# Patient Record
Sex: MALE | Race: WHITE | NOT HISPANIC OR LATINO | Employment: OTHER | URBAN - METROPOLITAN AREA
[De-identification: names, ages, dates, MRNs, and addresses within clinical notes are randomized per-mention and may not be internally consistent; named-entity substitution may affect disease eponyms.]

---

## 2019-08-12 ENCOUNTER — HOSPITAL ENCOUNTER (OUTPATIENT)
Dept: RADIOLOGY | Facility: MEDICAL CENTER | Age: 84
End: 2019-08-12
Attending: FAMILY MEDICINE
Payer: COMMERCIAL

## 2019-08-12 ENCOUNTER — OFFICE VISIT (OUTPATIENT)
Dept: URGENT CARE | Facility: PHYSICIAN GROUP | Age: 84
End: 2019-08-12
Payer: COMMERCIAL

## 2019-08-12 VITALS
HEART RATE: 94 BPM | SYSTOLIC BLOOD PRESSURE: 140 MMHG | RESPIRATION RATE: 14 BRPM | OXYGEN SATURATION: 95 % | TEMPERATURE: 98.8 F | WEIGHT: 195 LBS | DIASTOLIC BLOOD PRESSURE: 92 MMHG | BODY MASS INDEX: 26.41 KG/M2 | HEIGHT: 72 IN

## 2019-08-12 DIAGNOSIS — S69.92XA INJURY OF LEFT WRIST, INITIAL ENCOUNTER: ICD-10-CM

## 2019-08-12 PROCEDURE — 99213 OFFICE O/P EST LOW 20 MIN: CPT | Performed by: FAMILY MEDICINE

## 2019-08-12 PROCEDURE — 73110 X-RAY EXAM OF WRIST: CPT | Mod: LT

## 2019-08-15 ASSESSMENT — ENCOUNTER SYMPTOMS
NUMBNESS: 0
WEAKNESS: 0
JOINT SWELLING: 0

## 2019-08-15 NOTE — PROGRESS NOTES
Subjective:   Felipe Chowdary is a 87 y.o. male who presents for Hand Injury (L hand, fell this morning and used hand as a brace)     Hand Injury   This is a new problem. The current episode started today. The problem occurs constantly. The problem has been unchanged. Pertinent negatives include no joint swelling, numbness or weakness.     Review of Systems   Musculoskeletal: Negative for joint swelling.   Neurological: Negative for weakness and numbness.      Objective:   /92 (BP Location: Right arm, Patient Position: Sitting, BP Cuff Size: Adult)   Pulse 94   Temp 37.1 °C (98.8 °F) (Temporal)   Resp 14   Ht 1.829 m (6')   Wt 88.5 kg (195 lb)   SpO2 95%   BMI 26.45 kg/m²   Physical Exam   Constitutional: He is oriented to person, place, and time. He appears well-developed and well-nourished. No distress.   HENT:   Head: Normocephalic and atraumatic.   Eyes: Pupils are equal, round, and reactive to light. Conjunctivae are normal.   Cardiovascular: Normal rate and regular rhythm.   Pulmonary/Chest: Effort normal and breath sounds normal.   Musculoskeletal:        Left wrist: He exhibits decreased range of motion, tenderness, bony tenderness and swelling. He exhibits no deformity.   Neurological: He is alert and oriented to person, place, and time.   Skin: Skin is warm and dry.   Psychiatric: He has a normal mood and affect. Thought content normal.   Vitals reviewed.       Assessment/Plan:   1. Injury of left wrist, initial encounter  - DX-WRIST-COMPLETE 3+ LEFT; Future  - REFERRAL TO SPORTS MEDICINE  Use over-the-counter pain reliever, such as acetaminophen (Tylenol), ibuprofen (Advil, Motrin) or naproxen (Aleve) as needed; follow package directions for dosing.   Differential diagnosis, natural history, supportive care, and indications for immediate follow-up discussed.

## 2021-01-15 DIAGNOSIS — Z23 NEED FOR VACCINATION: ICD-10-CM

## 2022-05-25 ENCOUNTER — APPOINTMENT (OUTPATIENT)
Dept: RADIOLOGY | Facility: MEDICAL CENTER | Age: 87
End: 2022-05-25
Attending: INTERNAL MEDICINE
Payer: COMMERCIAL

## 2024-07-22 ENCOUNTER — HOSPITAL ENCOUNTER (OUTPATIENT)
Facility: MEDICAL CENTER | Age: 89
End: 2024-07-22
Attending: STUDENT IN AN ORGANIZED HEALTH CARE EDUCATION/TRAINING PROGRAM
Payer: MEDICARE

## 2024-07-22 ENCOUNTER — OFFICE VISIT (OUTPATIENT)
Dept: URGENT CARE | Facility: PHYSICIAN GROUP | Age: 89
End: 2024-07-22
Payer: MEDICARE

## 2024-07-22 VITALS
SYSTOLIC BLOOD PRESSURE: 126 MMHG | OXYGEN SATURATION: 93 % | HEART RATE: 62 BPM | DIASTOLIC BLOOD PRESSURE: 78 MMHG | TEMPERATURE: 96.7 F | HEIGHT: 73 IN | RESPIRATION RATE: 16 BRPM | WEIGHT: 194 LBS | BODY MASS INDEX: 25.71 KG/M2

## 2024-07-22 DIAGNOSIS — N30.01 ACUTE CYSTITIS WITH HEMATURIA: Primary | ICD-10-CM

## 2024-07-22 DIAGNOSIS — N30.01 ACUTE CYSTITIS WITH HEMATURIA: ICD-10-CM

## 2024-07-22 LAB
APPEARANCE UR: ABNORMAL
BILIRUB UR STRIP-MCNC: ABNORMAL MG/DL
COLOR UR AUTO: ABNORMAL
GLUCOSE UR STRIP.AUTO-MCNC: 100 MG/DL
KETONES UR STRIP.AUTO-MCNC: 80 MG/DL
LEUKOCYTE ESTERASE UR QL STRIP.AUTO: ABNORMAL
NITRITE UR QL STRIP.AUTO: POSITIVE
PH UR STRIP.AUTO: 8.5 [PH] (ref 5–8)
PROT UR QL STRIP: >=300 MG/DL
RBC UR QL AUTO: ABNORMAL
SP GR UR STRIP.AUTO: <=1.005
UROBILINOGEN UR STRIP-MCNC: >=8 MG/DL

## 2024-07-22 PROCEDURE — 99204 OFFICE O/P NEW MOD 45 MIN: CPT | Performed by: STUDENT IN AN ORGANIZED HEALTH CARE EDUCATION/TRAINING PROGRAM

## 2024-07-22 PROCEDURE — 3078F DIAST BP <80 MM HG: CPT | Performed by: STUDENT IN AN ORGANIZED HEALTH CARE EDUCATION/TRAINING PROGRAM

## 2024-07-22 PROCEDURE — 87086 URINE CULTURE/COLONY COUNT: CPT

## 2024-07-22 PROCEDURE — 87186 SC STD MICRODIL/AGAR DIL: CPT

## 2024-07-22 PROCEDURE — 3074F SYST BP LT 130 MM HG: CPT | Performed by: STUDENT IN AN ORGANIZED HEALTH CARE EDUCATION/TRAINING PROGRAM

## 2024-07-22 PROCEDURE — 87077 CULTURE AEROBIC IDENTIFY: CPT

## 2024-07-22 PROCEDURE — 81002 URINALYSIS NONAUTO W/O SCOPE: CPT | Performed by: STUDENT IN AN ORGANIZED HEALTH CARE EDUCATION/TRAINING PROGRAM

## 2024-07-22 RX ORDER — PHENAZOPYRIDINE HYDROCHLORIDE 200 MG/1
200 TABLET, FILM COATED ORAL 3 TIMES DAILY
Qty: 6 TABLET | Refills: 0 | Status: SHIPPED | OUTPATIENT
Start: 2024-07-22 | End: 2024-07-24

## 2024-07-22 RX ORDER — CIPROFLOXACIN 500 MG/1
500 TABLET, FILM COATED ORAL EVERY 12 HOURS
Qty: 14 TABLET | Refills: 0 | Status: SHIPPED | OUTPATIENT
Start: 2024-07-22 | End: 2024-07-29

## 2024-07-22 ASSESSMENT — ENCOUNTER SYMPTOMS
DIARRHEA: 0
ABDOMINAL PAIN: 0
VOMITING: 0
CONSTIPATION: 0
FLANK PAIN: 0
CHILLS: 0
FEVER: 0
NAUSEA: 0

## 2024-07-25 LAB
BACTERIA UR CULT: ABNORMAL
BACTERIA UR CULT: ABNORMAL
SIGNIFICANT IND 70042: ABNORMAL
SITE SITE: ABNORMAL
SOURCE SOURCE: ABNORMAL

## 2024-07-28 ENCOUNTER — TELEPHONE (OUTPATIENT)
Dept: URGENT CARE | Facility: PHYSICIAN GROUP | Age: 89
End: 2024-07-28
Payer: MEDICARE

## 2024-07-29 ENCOUNTER — TELEPHONE (OUTPATIENT)
Dept: URGENT CARE | Facility: CLINIC | Age: 89
End: 2024-07-29
Payer: MEDICARE

## 2024-07-29 ENCOUNTER — HOSPITAL ENCOUNTER (OUTPATIENT)
Facility: MEDICAL CENTER | Age: 89
End: 2024-07-29
Attending: STUDENT IN AN ORGANIZED HEALTH CARE EDUCATION/TRAINING PROGRAM
Payer: MEDICARE

## 2024-07-29 ENCOUNTER — TELEPHONE (OUTPATIENT)
Dept: URGENT CARE | Facility: PHYSICIAN GROUP | Age: 89
End: 2024-07-29
Payer: MEDICARE

## 2024-07-29 DIAGNOSIS — Z87.440 HISTORY OF UTI: ICD-10-CM

## 2024-07-29 PROCEDURE — 87086 URINE CULTURE/COLONY COUNT: CPT

## 2024-07-31 ENCOUNTER — TELEPHONE (OUTPATIENT)
Dept: URGENT CARE | Facility: PHYSICIAN GROUP | Age: 89
End: 2024-07-31
Payer: MEDICARE

## 2024-07-31 LAB
BACTERIA UR CULT: NORMAL
SIGNIFICANT IND 70042: NORMAL
SITE SITE: NORMAL
SOURCE SOURCE: NORMAL

## 2024-09-20 ENCOUNTER — HOSPITAL ENCOUNTER (OUTPATIENT)
Dept: RADIOLOGY | Facility: MEDICAL CENTER | Age: 89
End: 2024-09-20
Payer: COMMERCIAL

## 2024-10-08 ENCOUNTER — HOSPITAL ENCOUNTER (OUTPATIENT)
Dept: RADIOLOGY | Facility: MEDICAL CENTER | Age: 89
End: 2024-10-08
Attending: UROLOGY
Payer: COMMERCIAL

## 2024-10-08 DIAGNOSIS — R31.0 GROSS HEMATURIA: ICD-10-CM

## 2024-10-08 ASSESSMENT — ENCOUNTER SYMPTOMS
CARDIOVASCULAR NEGATIVE: 1
CHILLS: 0
WEAKNESS: 0
WEIGHT LOSS: 0
DIAPHORESIS: 0
FOCAL WEAKNESS: 0
FEVER: 0
CONSTITUTIONAL NEGATIVE: 1
SENSORY CHANGE: 0
RESPIRATORY NEGATIVE: 1
GASTROINTESTINAL NEGATIVE: 1
SPEECH CHANGE: 0

## 2024-10-08 ASSESSMENT — LIFESTYLE VARIABLES: SUBSTANCE_ABUSE: 0

## 2024-10-14 ENCOUNTER — APPOINTMENT (OUTPATIENT)
Dept: ADMISSIONS | Facility: MEDICAL CENTER | Age: 89
End: 2024-10-14
Attending: STUDENT IN AN ORGANIZED HEALTH CARE EDUCATION/TRAINING PROGRAM
Payer: COMMERCIAL

## 2024-10-17 ENCOUNTER — PRE-ADMISSION TESTING (OUTPATIENT)
Dept: ADMISSIONS | Facility: MEDICAL CENTER | Age: 89
End: 2024-10-17
Attending: STUDENT IN AN ORGANIZED HEALTH CARE EDUCATION/TRAINING PROGRAM
Payer: COMMERCIAL

## 2024-10-17 RX ORDER — TERAZOSIN 2 MG/1
4 CAPSULE ORAL NIGHTLY
COMMUNITY

## 2024-10-17 RX ORDER — PANTOPRAZOLE SODIUM 40 MG/1
40 TABLET, DELAYED RELEASE ORAL DAILY
COMMUNITY

## 2024-10-17 RX ORDER — ASPIRIN 81 MG/1
81 TABLET ORAL DAILY
Status: ON HOLD | COMMUNITY
End: 2024-11-24

## 2024-10-17 RX ORDER — LOSARTAN POTASSIUM 25 MG/1
25 TABLET ORAL DAILY
COMMUNITY

## 2024-10-17 RX ORDER — FINASTERIDE 5 MG/1
5 TABLET, FILM COATED ORAL DAILY
COMMUNITY

## 2024-10-31 ENCOUNTER — HOSPITAL ENCOUNTER (OUTPATIENT)
Facility: MEDICAL CENTER | Age: 89
End: 2024-10-31
Attending: STUDENT IN AN ORGANIZED HEALTH CARE EDUCATION/TRAINING PROGRAM | Admitting: STUDENT IN AN ORGANIZED HEALTH CARE EDUCATION/TRAINING PROGRAM
Payer: COMMERCIAL

## 2024-10-31 ENCOUNTER — APPOINTMENT (OUTPATIENT)
Dept: RADIOLOGY | Facility: MEDICAL CENTER | Age: 89
End: 2024-10-31
Attending: STUDENT IN AN ORGANIZED HEALTH CARE EDUCATION/TRAINING PROGRAM
Payer: COMMERCIAL

## 2024-10-31 VITALS
WEIGHT: 201.28 LBS | HEIGHT: 72 IN | OXYGEN SATURATION: 96 % | RESPIRATION RATE: 16 BRPM | DIASTOLIC BLOOD PRESSURE: 97 MMHG | TEMPERATURE: 96.8 F | HEART RATE: 77 BPM | SYSTOLIC BLOOD PRESSURE: 146 MMHG | BODY MASS INDEX: 27.26 KG/M2

## 2024-10-31 DIAGNOSIS — N13.8 BPH WITH URINARY OBSTRUCTION: ICD-10-CM

## 2024-10-31 DIAGNOSIS — R31.0 GROSS HEMATURIA: ICD-10-CM

## 2024-10-31 DIAGNOSIS — N40.1 BPH WITH URINARY OBSTRUCTION: ICD-10-CM

## 2024-10-31 LAB
BASOPHILS # BLD AUTO: 0.4 % (ref 0–1.8)
BASOPHILS # BLD: 0.02 K/UL (ref 0–0.12)
CREAT SERPL-MCNC: 1.26 MG/DL (ref 0.5–1.4)
EOSINOPHIL # BLD AUTO: 0.06 K/UL (ref 0–0.51)
EOSINOPHIL NFR BLD: 1.2 % (ref 0–6.9)
ERYTHROCYTE [DISTWIDTH] IN BLOOD BY AUTOMATED COUNT: 45.9 FL (ref 35.9–50)
GFR SERPLBLD CREATININE-BSD FMLA CKD-EPI: 53 ML/MIN/1.73 M 2
HCT VFR BLD AUTO: 41.7 % (ref 42–52)
HGB BLD-MCNC: 13.7 G/DL (ref 14–18)
IMM GRANULOCYTES # BLD AUTO: 0.01 K/UL (ref 0–0.11)
IMM GRANULOCYTES NFR BLD AUTO: 0.2 % (ref 0–0.9)
INR PPP: 1.04 (ref 0.87–1.13)
LYMPHOCYTES # BLD AUTO: 2.55 K/UL (ref 1–4.8)
LYMPHOCYTES NFR BLD: 49 % (ref 22–41)
MCH RBC QN AUTO: 30.9 PG (ref 27–33)
MCHC RBC AUTO-ENTMCNC: 32.9 G/DL (ref 32.3–36.5)
MCV RBC AUTO: 93.9 FL (ref 81.4–97.8)
MONOCYTES # BLD AUTO: 0.45 K/UL (ref 0–0.85)
MONOCYTES NFR BLD AUTO: 8.7 % (ref 0–13.4)
NEUTROPHILS # BLD AUTO: 2.11 K/UL (ref 1.82–7.42)
NEUTROPHILS NFR BLD: 40.5 % (ref 44–72)
NRBC # BLD AUTO: 0 K/UL
NRBC BLD-RTO: 0 /100 WBC (ref 0–0.2)
PLATELET # BLD AUTO: 137 K/UL (ref 164–446)
PMV BLD AUTO: 10.8 FL (ref 9–12.9)
PROTHROMBIN TIME: 13.6 SEC (ref 12–14.6)
RBC # BLD AUTO: 4.44 M/UL (ref 4.7–6.1)
WBC # BLD AUTO: 5.2 K/UL (ref 4.8–10.8)

## 2024-10-31 PROCEDURE — 85610 PROTHROMBIN TIME: CPT

## 2024-10-31 PROCEDURE — 160046 HCHG PACU - 1ST 60 MINS PHASE II

## 2024-10-31 PROCEDURE — 700102 HCHG RX REV CODE 250 W/ 637 OVERRIDE(OP): Performed by: STUDENT IN AN ORGANIZED HEALTH CARE EDUCATION/TRAINING PROGRAM

## 2024-10-31 PROCEDURE — 160002 HCHG RECOVERY MINUTES (STAT)

## 2024-10-31 PROCEDURE — 85025 COMPLETE CBC W/AUTO DIFF WBC: CPT

## 2024-10-31 PROCEDURE — 700111 HCHG RX REV CODE 636 W/ 250 OVERRIDE (IP): Performed by: STUDENT IN AN ORGANIZED HEALTH CARE EDUCATION/TRAINING PROGRAM

## 2024-10-31 PROCEDURE — 82565 ASSAY OF CREATININE: CPT

## 2024-10-31 PROCEDURE — A9270 NON-COVERED ITEM OR SERVICE: HCPCS | Performed by: STUDENT IN AN ORGANIZED HEALTH CARE EDUCATION/TRAINING PROGRAM

## 2024-10-31 PROCEDURE — 160047 HCHG PACU  - EA ADDL 30 MINS PHASE II

## 2024-10-31 PROCEDURE — 84153 ASSAY OF PSA TOTAL: CPT

## 2024-10-31 PROCEDURE — 700117 HCHG RX CONTRAST REV CODE 255: Performed by: STUDENT IN AN ORGANIZED HEALTH CARE EDUCATION/TRAINING PROGRAM

## 2024-10-31 PROCEDURE — 700111 HCHG RX REV CODE 636 W/ 250 OVERRIDE (IP)

## 2024-10-31 PROCEDURE — 99153 MOD SED SAME PHYS/QHP EA: CPT

## 2024-10-31 PROCEDURE — 36245 INS CATH ABD/L-EXT ART 1ST: CPT

## 2024-10-31 PROCEDURE — 160035 HCHG PACU - 1ST 60 MINS PHASE I

## 2024-10-31 RX ORDER — MIDAZOLAM HYDROCHLORIDE 1 MG/ML
INJECTION INTRAMUSCULAR; INTRAVENOUS
Status: COMPLETED
Start: 2024-10-31 | End: 2024-10-31

## 2024-10-31 RX ORDER — SODIUM CHLORIDE 9 MG/ML
500 INJECTION, SOLUTION INTRAVENOUS
Status: ACTIVE | OUTPATIENT
Start: 2024-10-31 | End: 2024-10-31

## 2024-10-31 RX ORDER — CIPROFLOXACIN 750 MG/1
750 TABLET, FILM COATED ORAL 2 TIMES DAILY
Qty: 10 TABLET | Refills: 0 | Status: SHIPPED | OUTPATIENT
Start: 2024-10-31 | End: 2024-11-04 | Stop reason: SDUPTHER

## 2024-10-31 RX ORDER — ONDANSETRON 2 MG/ML
4 INJECTION INTRAMUSCULAR; INTRAVENOUS PRN
Status: ACTIVE | OUTPATIENT
Start: 2024-10-31 | End: 2024-10-31

## 2024-10-31 RX ORDER — CIPROFLOXACIN 750 MG/1
750 TABLET, FILM COATED ORAL EVERY 12 HOURS
Status: DISCONTINUED | OUTPATIENT
Start: 2024-10-31 | End: 2024-10-31

## 2024-10-31 RX ORDER — MIDAZOLAM HYDROCHLORIDE 1 MG/ML
INJECTION INTRAMUSCULAR; INTRAVENOUS
Status: COMPLETED | OUTPATIENT
Start: 2024-10-31 | End: 2024-10-31

## 2024-10-31 RX ORDER — CIPROFLOXACIN 750 MG/1
750 TABLET, FILM COATED ORAL EVERY 12 HOURS
Status: SHIPPED | OUTPATIENT
Start: 2024-10-31 | End: 2024-11-10

## 2024-10-31 RX ORDER — CIPROFLOXACIN 750 MG/1
750 TABLET, FILM COATED ORAL EVERY 12 HOURS
Status: DISCONTINUED | OUTPATIENT
Start: 2024-10-31 | End: 2024-10-31 | Stop reason: HOSPADM

## 2024-10-31 RX ORDER — MIDAZOLAM HYDROCHLORIDE 1 MG/ML
.5-2 INJECTION INTRAMUSCULAR; INTRAVENOUS PRN
Status: ACTIVE | OUTPATIENT
Start: 2024-10-31 | End: 2024-10-31

## 2024-10-31 RX ORDER — HYDRALAZINE HYDROCHLORIDE 20 MG/ML
10 INJECTION INTRAMUSCULAR; INTRAVENOUS ONCE
Status: COMPLETED | OUTPATIENT
Start: 2024-10-31 | End: 2024-10-31

## 2024-10-31 RX ADMIN — MIDAZOLAM HYDROCHLORIDE 1 MG: 1 INJECTION, SOLUTION INTRAMUSCULAR; INTRAVENOUS at 09:12

## 2024-10-31 RX ADMIN — MIDAZOLAM HYDROCHLORIDE 1 MG: 1 INJECTION, SOLUTION INTRAMUSCULAR; INTRAVENOUS at 08:44

## 2024-10-31 RX ADMIN — FENTANYL CITRATE 50 MCG: 50 INJECTION, SOLUTION INTRAMUSCULAR; INTRAVENOUS at 08:28

## 2024-10-31 RX ADMIN — IOHEXOL 112 ML: 300 INJECTION, SOLUTION INTRAVENOUS at 10:15

## 2024-10-31 RX ADMIN — MIDAZOLAM HYDROCHLORIDE 1 MG: 2 INJECTION, SOLUTION INTRAMUSCULAR; INTRAVENOUS at 09:12

## 2024-10-31 RX ADMIN — FENTANYL CITRATE 25 MCG: 50 INJECTION, SOLUTION INTRAMUSCULAR; INTRAVENOUS at 09:01

## 2024-10-31 RX ADMIN — FENTANYL CITRATE 50 MCG: 50 INJECTION, SOLUTION INTRAMUSCULAR; INTRAVENOUS at 08:52

## 2024-10-31 RX ADMIN — FENTANYL CITRATE 50 MCG: 50 INJECTION, SOLUTION INTRAMUSCULAR; INTRAVENOUS at 08:38

## 2024-10-31 RX ADMIN — FENTANYL CITRATE 50 MCG: 50 INJECTION, SOLUTION INTRAMUSCULAR; INTRAVENOUS at 09:12

## 2024-10-31 RX ADMIN — MIDAZOLAM HYDROCHLORIDE 1 MG: 2 INJECTION, SOLUTION INTRAMUSCULAR; INTRAVENOUS at 08:44

## 2024-10-31 RX ADMIN — CIPROFLOXACIN HYDROCHLORIDE 750 MG: 750 TABLET, FILM COATED ORAL at 14:58

## 2024-10-31 RX ADMIN — HYDRALAZINE HYDROCHLORIDE 10 MG: 20 INJECTION INTRAMUSCULAR; INTRAVENOUS at 07:39

## 2024-10-31 RX ADMIN — FENTANYL CITRATE 25 MCG: 50 INJECTION, SOLUTION INTRAMUSCULAR; INTRAVENOUS at 09:03

## 2024-10-31 RX ADMIN — MIDAZOLAM HYDROCHLORIDE 1 MG: 2 INJECTION, SOLUTION INTRAMUSCULAR; INTRAVENOUS at 08:28

## 2024-10-31 RX ADMIN — MIDAZOLAM HYDROCHLORIDE 1 MG: 1 INJECTION, SOLUTION INTRAMUSCULAR; INTRAVENOUS at 08:28

## 2024-10-31 RX ADMIN — MIDAZOLAM HYDROCHLORIDE 1 MG: 2 INJECTION, SOLUTION INTRAMUSCULAR; INTRAVENOUS at 08:17

## 2024-10-31 RX ADMIN — MIDAZOLAM HYDROCHLORIDE 1 MG: 2 INJECTION, SOLUTION INTRAMUSCULAR; INTRAVENOUS at 09:27

## 2024-10-31 RX ADMIN — MIDAZOLAM HYDROCHLORIDE 1 MG: 2 INJECTION, SOLUTION INTRAMUSCULAR; INTRAVENOUS at 08:55

## 2024-10-31 NOTE — OR SURGEON
Immediate Post- Operative Note        Findings: Right prostate artery      Procedure(s): Right prostate artery embo      Estimated Blood Loss: Less than 5 ml        Complications: None            10/31/2024     9:46 AM     Francisco Javier Tiwari M.D.

## 2024-10-31 NOTE — DISCHARGE INSTRUCTIONS
Embolization, Care After  The following information offers guidance on how to care for yourself after your procedure. Your health care provider may also give you more specific instructions. If you have problems or questions, contact your health care provider.  What can I expect after the procedure?  After the procedure, it is common to have pain or bruising at the area where your catheter was inserted (insertion site).  You may have other symptoms depending on the part of your body that was treated. For example:  Embolization of the arteries in the brain may cause a headache.  Embolization of the arteries in the stomach may cause loss of appetite or nausea.  Women who have embolization of fibroid tumors in the uterus may have pain or cramps.  Follow these instructions at home:  Insertion site care  If the site starts to bleed, lie down flat and put pressure on the site. If the bleeding does not stop, get help right away. This is a medical emergency.  Follow instructions from your health care provider about how to take care of your insertion site. Make sure you:  Wash your hands with soap and water for at least 20 seconds before and after you change your bandage (dressing). If soap and water are not available, use hand .  You may remove the dressing after 24 hours. You can shower after dressing has been removed, but no submerging in water (ex: baths, lakes, hot tubs) for one week  Keep the insertion site clean. To do this:  Gently wash the site with plain soap and water.  Pat the area dry with a clean towel.  Do not rub the site. This may cause bleeding.  Check your insertion site every day for signs of infection. Check for:  Redness, swelling, or pain.  Fluid or blood.  Warmth.  Pus or a bad smell.  Do not take baths, swim, or use a hot tub until your health care provider approves. You may be allowed to shower 24-48 hours after the procedure.  Activity  Do not lift anything heavier than 10 pound for one  week. If possible limit the amount of time you go up/down stairs. Avoid bending and squatting down   If you were given a sedative during the procedure, it can affect you for several hours. Do not drive or operate machinery until your health care provider says that it is safe.  Return to your normal activities as told by your health care provider. Ask your health care provider what activities are safe for you.  General instructions  Take over-the-counter and prescription medicines only as told by your health care provider.  Ask your health care provider if the medicine prescribed to you:  Requires you to avoid driving or using machinery.  Can cause constipation. You may need to take these actions to prevent or treat constipation:  Drink enough fluid to keep your urine pale yellow.  Take over-the-counter or prescription medicines.  Eat foods that are high in fiber, such as beans, whole grains, and fresh fruits and vegetables.  Limit foods that are high in fat and processed sugars, such as fried or sweet foods.  Keep all follow-up visits. This is important.  Contact a health care provider if:  You have pain that gets worse or does not get better with medicine.  You have redness, swelling, or pain around your insertion site.  You have fluid or blood coming from your insertion site.  Your insertion site feels warm to the touch.  You have pus or a bad smell coming from your insertion site.  You have a fever.  You have nausea or vomiting.  Get help right away if:  The insertion area swells very quickly.  The insertion area is bleeding, and the bleeding does not stop after you hold steady pressure on the area.  The area near or just beyond the insertion site becomes pale, cool, tingly, or numb.  You faint or feel like you might faint.  You have chest pain.  You have trouble breathing.  You feel weak or have trouble moving your arms or legs.  You have problems with balance, speech, or vision.  These symptoms may be an  emergency. Get help right away. Call 911.  Do not wait to see if the symptoms will go away.  Do not drive yourself to the hospital.  Summary  After your procedure, it is common to have pain or bruising at the area where your catheter was inserted.  If you were given a sedative during the procedure, it can affect you for several hours. Do not drive or operate machinery until your health care provider says that it is safe.  Follow instructions from your health care provider about how to take care of your insertion site and about activity restrictions.  Contact a health care provider if you have a fever or if you have redness, swelling, or pain around your insertion site.  If the site starts to bleed, lie down flat and put pressure on the site. If the bleeding does not stop, get help right away. This is a medical emergency.  This information is not intended to replace advice given to you by your health care provider. Make sure you discuss any questions you have with your health care provider.  Document Revised: 11/07/2022 Document Reviewed: 10/09/2022  Elsevier Patient Education © 2023 [employer]

## 2024-10-31 NOTE — PROGRESS NOTES
Pt presents to IR1. Patient was consented by MD at bedside, confirmed by this RN and consent at bedside. Pt transferred to IR table in Supine position. Patient underwent a Prostate artery embolization by Dr. Tiwari.  Patient hypertensive in PPU, hypertensive when arrived to IR. MD verbal order 1x dose of Versed 1mg IVP for anxiety. Procedure site was marked by MD and verified using imaging guidance. Pt placed on monitor, prepped and draped in a sterile fashion. Vitals were taken every 5 minutes and remained stable during procedure (see doc flow sheet for results). CO2 waveform capnography was monitored and remained WNL throughout procedure. Report called to Rahel PPU RN. Pt transported by stretcher with RN to PPU 3.         [facility]  DEPLOYED IN Right prostate artery   REF: [id number]  LOT: [id number]  EXP: 2027-04-03

## 2024-10-31 NOTE — PROGRESS NOTES
1000- Patient arrived to PACU. Report received. Attached to monitors. Verified parameters and alarms. Right groin site is CDI and soft. Right pedal pulse 1+. Patient denies pain, numbness, and tingling. Patient educated on bedrest orders until 1400    1013- Patient tolerated PO fluids. Belongings returned to patient. Update provided to Norma hill. All questions answered     1030- Patient tolerated PO snack    1140- medrano removed per protocol     1410- Bed rest completed per orders. Patient ambulated with steady gait. No bleeding or firmness observed at groin site post ambulation. Right groin site is CDI and soft. Encouraged patient to increase oral intake of fluids     1440- patient ambulated to restroom with steady gait. Patient voided     1500- Site is clean, dry, and intact. Discharge instructions provided to patient and relative. Discussed follow up appointments, diet, medications and activity. Patient states understanding. IV was removed. Copy of discharge provided to the patient. A signed copy of discharge is in patient's chart. All personal belongings are in patients possession. All questions have been answered.     1510- Patient wheeled off floor by RN

## 2024-11-02 ENCOUNTER — HOSPITAL ENCOUNTER (OUTPATIENT)
Facility: MEDICAL CENTER | Age: 89
End: 2024-11-02
Attending: FAMILY MEDICINE
Payer: COMMERCIAL

## 2024-11-02 ENCOUNTER — OFFICE VISIT (OUTPATIENT)
Dept: URGENT CARE | Facility: PHYSICIAN GROUP | Age: 89
End: 2024-11-02
Payer: COMMERCIAL

## 2024-11-02 VITALS
WEIGHT: 200 LBS | HEART RATE: 85 BPM | TEMPERATURE: 97.1 F | SYSTOLIC BLOOD PRESSURE: 128 MMHG | BODY MASS INDEX: 27.09 KG/M2 | DIASTOLIC BLOOD PRESSURE: 72 MMHG | RESPIRATION RATE: 16 BRPM | OXYGEN SATURATION: 95 % | HEIGHT: 72 IN

## 2024-11-02 DIAGNOSIS — R30.0 DYSURIA: ICD-10-CM

## 2024-11-02 LAB
APPEARANCE UR: NORMAL
BILIRUB UR STRIP-MCNC: NORMAL MG/DL
COLOR UR AUTO: NORMAL
GLUCOSE UR STRIP.AUTO-MCNC: NORMAL MG/DL
KETONES UR STRIP.AUTO-MCNC: NORMAL MG/DL
LEUKOCYTE ESTERASE UR QL STRIP.AUTO: NORMAL
NITRITE UR QL STRIP.AUTO: POSITIVE
PH UR STRIP.AUTO: 5 [PH] (ref 5–8)
PROT UR QL STRIP: NORMAL MG/DL
PSA FREE MFR SERPL: NORMAL %
PSA FREE SERPL-MCNC: NORMAL NG/ML
PSA SERPL-MCNC: 1.5 NG/ML (ref 0–4)
RBC UR QL AUTO: NORMAL
SP GR UR STRIP.AUTO: 1.02
UROBILINOGEN UR STRIP-MCNC: NORMAL MG/DL

## 2024-11-02 PROCEDURE — 3078F DIAST BP <80 MM HG: CPT | Performed by: FAMILY MEDICINE

## 2024-11-02 PROCEDURE — 3074F SYST BP LT 130 MM HG: CPT | Performed by: FAMILY MEDICINE

## 2024-11-02 PROCEDURE — 99213 OFFICE O/P EST LOW 20 MIN: CPT | Performed by: FAMILY MEDICINE

## 2024-11-02 PROCEDURE — 87086 URINE CULTURE/COLONY COUNT: CPT

## 2024-11-02 PROCEDURE — 81002 URINALYSIS NONAUTO W/O SCOPE: CPT | Performed by: FAMILY MEDICINE

## 2024-11-02 NOTE — PROGRESS NOTES
"Follow-up note:    Called patient to follow-up after prostate artery embolization. Patient expresses that he is having burning with urination and can only urinate \"a few drops.\" Patient was able to have a large, pain-free urination prior to discharge and after removal of the Ayala catheter. Has been taking his ciprofloxacin since discharge.    Due to concerns for urinary tract infection, recommend patient be evaluated at urgent care for further workup and evaluation to determine if he has an active urinary tract infection or irritation from the Ayala catheter. Patient is agreeable with this plan.    Recommend cultures if patient has evidence of UTI to better tailor antibiotics.  "

## 2024-11-02 NOTE — PROGRESS NOTES
Subjective     Felipe Chowdary is a 89 y.o. male who presents with Dysuria (Burning while urinating, was seen for UTI and have medication for it but not working X  2 days )            2 days urinary burning after prostate embolization.  Currently taking both Azo and Cipro.  No fever.  No flank pain.  No blood in urine.  He does have PMH UTI.  No other aggravating or alleviating factors.        Review of Systems   Constitutional:  Negative for malaise/fatigue and weight loss.   Eyes:  Negative for discharge and redness.   Gastrointestinal:  Negative for nausea and vomiting.   Musculoskeletal:  Negative for joint pain and myalgias.   Skin:  Negative for itching and rash.              Objective     /72 (BP Location: Right arm, Patient Position: Sitting, BP Cuff Size: Adult)   Pulse 85   Temp 36.2 °C (97.1 °F) (Temporal)   Resp 16   Ht 1.829 m (6')   Wt 90.7 kg (200 lb)   SpO2 95%   BMI 27.12 kg/m²      Physical Exam  Constitutional:       General: He is not in acute distress.     Appearance: He is well-developed.   HENT:      Head: Normocephalic and atraumatic.   Eyes:      Conjunctiva/sclera: Conjunctivae normal.   Cardiovascular:      Rate and Rhythm: Normal rate.      Heart sounds: Normal heart sounds. No murmur heard.  Pulmonary:      Effort: Pulmonary effort is normal.      Breath sounds: Normal breath sounds. No wheezing.   Abdominal:      Palpations: Abdomen is soft.      Tenderness: There is no abdominal tenderness. There is no right CVA tenderness or left CVA tenderness.   Skin:     General: Skin is warm and dry.      Findings: No rash.   Neurological:      Mental Status: He is alert.                             Assessment & Plan   UA reviewed: Unreadable due to Azo     1. Dysuria  POCT Urinalysis    URINE CULTURE(NEW)        Differential diagnosis, natural history, supportive care, and indications for immediate follow-up were discussed.     Recommend continue Cipro.  Will follow-up urine  culture.

## 2024-11-03 ASSESSMENT — ENCOUNTER SYMPTOMS
WEIGHT LOSS: 0
EYE DISCHARGE: 0
VOMITING: 0
NAUSEA: 0
MYALGIAS: 0
EYE REDNESS: 0

## 2024-11-04 DIAGNOSIS — N40.1 BPH WITH URINARY OBSTRUCTION: ICD-10-CM

## 2024-11-04 DIAGNOSIS — N13.8 BPH WITH URINARY OBSTRUCTION: ICD-10-CM

## 2024-11-04 LAB
BACTERIA UR CULT: NORMAL
SIGNIFICANT IND 70042: NORMAL
SITE SITE: NORMAL
SOURCE SOURCE: NORMAL

## 2024-11-04 RX ORDER — CIPROFLOXACIN 750 MG/1
750 TABLET, FILM COATED ORAL 2 TIMES DAILY
Qty: 10 TABLET | Refills: 0 | Status: ON HOLD | OUTPATIENT
Start: 2024-11-04 | End: 2024-11-24

## 2024-11-04 NOTE — PROGRESS NOTES
"Pt called IR NP regarding post embolization problems with urinating \"fire\" since the procedure w/Dr. Tiwari 10/31/24. Was seen at  on 11/2, had UA and C & S, negative at 48 hours. Of note, had a urethral catheter for the embolization procedure but was able to urinate normally after it was discontinued in the PACU. Has been taking Azo and Cipro with no relief. Denies fevers. Notes he was supposed to have 10 days of Cipro but only has 5 days. He has been unable to get [facility], and was told he needed a new referral although he is an established patient.    Describes pain is present only with urination and on the inside of his urethra and 10/10. Describes frequency & nocturia, has been up every 30-45 minutes with minimal urination and does not think he is emptying. It is not the same as his frequent UTI symptoms. Also describes pain with defecation, tried an enema, last regular BM prior to the procedure. Has not noticed any color changes in his penis or perineal area.     Will send in rx to provide 10 days of antibiotic coverage. Advised pt to call his urologist again and if unable to secure an appointment in the short term, he should present to his ER for evaluation as he is not improved after measures prescribed by  visit. Discussed that there can be post embolization inflammation and he should be evaluated for urinary retention. Update send to DONNA CHRISTOPHER on call, will forward note [facility].   "

## 2024-11-05 NOTE — PROGRESS NOTES
Call from [facility] coordinator who spoke with pt regarding complaints from yesterday. He had contacted IR NP again today but no opportunity to return call before pt contacted [facility], and he does not recall 18 minute discussion w/this APRN from 11/4. He has similar complaints as yesterday and did not seek workup at ED.     Discussed PAE with the [facility] clinician, who has also recommended that the pt present to the [facility] for [facility]. Discussed additional post procedure precautions and e-faxed pt's IR records to her fax number. Appreciate collaborative care for this patient. We are available for any additional questions.

## 2024-11-12 NOTE — PROGRESS NOTES
Telephone encounter:    Received call from patient regarding continued pain, frequency, dysuria that began a couple days post prostate artery embolization with IR Dr. Tiwari on 10/31/2024.  Following procedure, patient was discharged on Cipro.  Patient went to the urgent care 11/02/24; UA negative.  He called MARLYN Dunaway, 11/04/2024 who advised him to go to the [facility].  Ml Banegas spoke with [facility] coordinator who also recommended [facility] ED for evaluation.  [facility] reports [facility] and received what he described as a comprehensive workup including bladder ultrasound that was ultimately negative.  Patient denies fever, body aches, chills.  In addition to frequency, burning, and occasional sharp pain, he reports constipation and some lower extremity edema.  No SOB, chest pain, orthopnea.    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  Reassured patient that this is a self-limiting condition that should gradually resolve.  While I do not believe the constipation is related to the procedure, I do believe it could be exacerbating the prostate pain and recommend he continue stool softeners that he [facility] the [facility].  Encourage patient to ambulate and elevate feet while at rest to help with edema as he reports he has been quite sedentary since procedure on 10/31.  He does report this is starting to improve.    ER precautions reviewed including fever, chills, body aches, increased shortness of breath, chest pain, and inability to void.  If symptoms are not improving within the next 2 weeks, encouraged patient to call back.  I also encouraged patient to follow-up with his urologist.  Patient voiced appreciation for information. 22 minutes total spent on the phone with pt.

## 2024-11-19 ENCOUNTER — HOSPITAL ENCOUNTER (INPATIENT)
Facility: MEDICAL CENTER | Age: 89
LOS: 4 days | End: 2024-11-24
Attending: STUDENT IN AN ORGANIZED HEALTH CARE EDUCATION/TRAINING PROGRAM | Admitting: STUDENT IN AN ORGANIZED HEALTH CARE EDUCATION/TRAINING PROGRAM
Payer: COMMERCIAL

## 2024-11-19 ENCOUNTER — APPOINTMENT (OUTPATIENT)
Dept: RADIOLOGY | Facility: MEDICAL CENTER | Age: 89
End: 2024-11-19
Attending: STUDENT IN AN ORGANIZED HEALTH CARE EDUCATION/TRAINING PROGRAM
Payer: COMMERCIAL

## 2024-11-19 DIAGNOSIS — E87.1 HYPONATREMIA: ICD-10-CM

## 2024-11-19 DIAGNOSIS — D62 ACUTE BLOOD LOSS ANEMIA: ICD-10-CM

## 2024-11-19 DIAGNOSIS — R31.0 GROSS HEMATURIA: ICD-10-CM

## 2024-11-19 DIAGNOSIS — G89.18 POST-OPERATIVE PAIN: ICD-10-CM

## 2024-11-19 DIAGNOSIS — K59.03 DRUG-INDUCED CONSTIPATION: ICD-10-CM

## 2024-11-19 DIAGNOSIS — B37.41 CANDIDA CYSTITIS: ICD-10-CM

## 2024-11-19 PROBLEM — K92.2 UPPER GI BLEED: Status: ACTIVE | Noted: 2024-11-19

## 2024-11-19 PROBLEM — R31.9 HEMATURIA: Status: ACTIVE | Noted: 2024-11-19

## 2024-11-19 PROBLEM — I16.0 HYPERTENSIVE URGENCY: Status: ACTIVE | Noted: 2024-11-19

## 2024-11-19 PROBLEM — J96.01 ACUTE HYPOXIC RESPIRATORY FAILURE (HCC): Status: ACTIVE | Noted: 2024-11-19

## 2024-11-19 PROBLEM — N40.0 BPH (BENIGN PROSTATIC HYPERPLASIA): Status: ACTIVE | Noted: 2024-11-19

## 2024-11-19 LAB
ALBUMIN SERPL BCP-MCNC: 3.3 G/DL (ref 3.2–4.9)
ALBUMIN/GLOB SERPL: 1.1 G/DL
ALP SERPL-CCNC: 110 U/L (ref 30–99)
ALT SERPL-CCNC: 33 U/L (ref 2–50)
ANION GAP SERPL CALC-SCNC: 11 MMOL/L (ref 7–16)
APPEARANCE UR: ABNORMAL
AST SERPL-CCNC: 23 U/L (ref 12–45)
BACTERIA #/AREA URNS HPF: ABNORMAL /HPF
BASOPHILS # BLD AUTO: 0.1 % (ref 0–1.8)
BASOPHILS # BLD: 0.01 K/UL (ref 0–0.12)
BILIRUB SERPL-MCNC: 0.7 MG/DL (ref 0.1–1.5)
BUN SERPL-MCNC: 23 MG/DL (ref 8–22)
CALCIUM ALBUM COR SERPL-MCNC: 9.1 MG/DL (ref 8.5–10.5)
CALCIUM SERPL-MCNC: 8.5 MG/DL (ref 8.5–10.5)
CASTS URNS QL MICRO: ABNORMAL /LPF (ref 0–2)
CHLORIDE SERPL-SCNC: 94 MMOL/L (ref 96–112)
CO2 SERPL-SCNC: 22 MMOL/L (ref 20–33)
COLOR UR: ABNORMAL
CREAT SERPL-MCNC: 1.35 MG/DL (ref 0.5–1.4)
EOSINOPHIL # BLD AUTO: 0.02 K/UL (ref 0–0.51)
EOSINOPHIL NFR BLD: 0.2 % (ref 0–6.9)
EPITHELIAL CELLS 1715: ABNORMAL /HPF (ref 0–5)
ERYTHROCYTE [DISTWIDTH] IN BLOOD BY AUTOMATED COUNT: 44 FL (ref 35.9–50)
GFR SERPLBLD CREATININE-BSD FMLA CKD-EPI: 49 ML/MIN/1.73 M 2
GLOBULIN SER CALC-MCNC: 3 G/DL (ref 1.9–3.5)
GLUCOSE SERPL-MCNC: 104 MG/DL (ref 65–99)
HCT VFR BLD AUTO: 30.1 % (ref 42–52)
HGB BLD-MCNC: 10.3 G/DL (ref 14–18)
IMM GRANULOCYTES # BLD AUTO: 0.1 K/UL (ref 0–0.11)
IMM GRANULOCYTES NFR BLD AUTO: 1.2 % (ref 0–0.9)
LYMPHOCYTES # BLD AUTO: 1.55 K/UL (ref 1–4.8)
LYMPHOCYTES NFR BLD: 19.1 % (ref 22–41)
MCH RBC QN AUTO: 30.7 PG (ref 27–33)
MCHC RBC AUTO-ENTMCNC: 34.2 G/DL (ref 32.3–36.5)
MCV RBC AUTO: 89.9 FL (ref 81.4–97.8)
MONOCYTES # BLD AUTO: 0.59 K/UL (ref 0–0.85)
MONOCYTES NFR BLD AUTO: 7.3 % (ref 0–13.4)
NEUTROPHILS # BLD AUTO: 5.86 K/UL (ref 1.82–7.42)
NEUTROPHILS NFR BLD: 72.1 % (ref 44–72)
NRBC # BLD AUTO: 0 K/UL
NRBC BLD-RTO: 0 /100 WBC (ref 0–0.2)
PLATELET # BLD AUTO: 361 K/UL (ref 164–446)
PMV BLD AUTO: 8.7 FL (ref 9–12.9)
POTASSIUM SERPL-SCNC: 4.9 MMOL/L (ref 3.6–5.5)
PROT SERPL-MCNC: 6.3 G/DL (ref 6–8.2)
RBC # BLD AUTO: 3.35 M/UL (ref 4.7–6.1)
RBC # URNS HPF: >100 /HPF (ref 0–2)
SODIUM SERPL-SCNC: 127 MMOL/L (ref 135–145)
WBC # BLD AUTO: 8.1 K/UL (ref 4.8–10.8)
WBC #/AREA URNS HPF: ABNORMAL /HPF
YEAST BUDDING URNS QL: PRESENT /HPF

## 2024-11-19 PROCEDURE — 99285 EMERGENCY DEPT VISIT HI MDM: CPT

## 2024-11-19 PROCEDURE — 51702 INSERT TEMP BLADDER CATH: CPT

## 2024-11-19 PROCEDURE — 85025 COMPLETE CBC W/AUTO DIFF WBC: CPT

## 2024-11-19 PROCEDURE — 80053 COMPREHEN METABOLIC PANEL: CPT

## 2024-11-19 PROCEDURE — 700111 HCHG RX REV CODE 636 W/ 250 OVERRIDE (IP): Mod: JZ | Performed by: STUDENT IN AN ORGANIZED HEALTH CARE EDUCATION/TRAINING PROGRAM

## 2024-11-19 PROCEDURE — 36415 COLL VENOUS BLD VENIPUNCTURE: CPT

## 2024-11-19 PROCEDURE — A9270 NON-COVERED ITEM OR SERVICE: HCPCS | Performed by: STUDENT IN AN ORGANIZED HEALTH CARE EDUCATION/TRAINING PROGRAM

## 2024-11-19 PROCEDURE — 96374 THER/PROPH/DIAG INJ IV PUSH: CPT

## 2024-11-19 PROCEDURE — 81001 URINALYSIS AUTO W/SCOPE: CPT

## 2024-11-19 PROCEDURE — 85730 THROMBOPLASTIN TIME PARTIAL: CPT

## 2024-11-19 PROCEDURE — 303105 HCHG CATHETER EXTRA

## 2024-11-19 PROCEDURE — 99223 1ST HOSP IP/OBS HIGH 75: CPT | Performed by: STUDENT IN AN ORGANIZED HEALTH CARE EDUCATION/TRAINING PROGRAM

## 2024-11-19 PROCEDURE — 84443 ASSAY THYROID STIM HORMONE: CPT

## 2024-11-19 PROCEDURE — 85610 PROTHROMBIN TIME: CPT

## 2024-11-19 PROCEDURE — 700101 HCHG RX REV CODE 250: Performed by: STUDENT IN AN ORGANIZED HEALTH CARE EDUCATION/TRAINING PROGRAM

## 2024-11-19 PROCEDURE — G0378 HOSPITAL OBSERVATION PER HR: HCPCS

## 2024-11-19 PROCEDURE — 700102 HCHG RX REV CODE 250 W/ 637 OVERRIDE(OP): Performed by: STUDENT IN AN ORGANIZED HEALTH CARE EDUCATION/TRAINING PROGRAM

## 2024-11-19 RX ORDER — FLUCONAZOLE 100 MG/1
200 TABLET ORAL ONCE
Status: COMPLETED | OUTPATIENT
Start: 2024-11-19 | End: 2024-11-19

## 2024-11-19 RX ORDER — HYDRALAZINE HYDROCHLORIDE 20 MG/ML
10 INJECTION INTRAMUSCULAR; INTRAVENOUS EVERY 4 HOURS PRN
Status: DISCONTINUED | OUTPATIENT
Start: 2024-11-19 | End: 2024-11-24 | Stop reason: HOSPADM

## 2024-11-19 RX ORDER — PHENAZOPYRIDINE HYDROCHLORIDE 200 MG/1
200 TABLET, FILM COATED ORAL 3 TIMES DAILY PRN
COMMUNITY

## 2024-11-19 RX ORDER — AMLODIPINE BESYLATE 5 MG/1
5 TABLET ORAL EVERY EVENING
COMMUNITY

## 2024-11-19 RX ORDER — LIDOCAINE HYDROCHLORIDE 20 MG/ML
JELLY TOPICAL ONCE
Status: COMPLETED | OUTPATIENT
Start: 2024-11-19 | End: 2024-11-19

## 2024-11-19 RX ORDER — DOCUSATE CALCIUM 240 MG
240 CAPSULE ORAL DAILY
COMMUNITY

## 2024-11-19 RX ORDER — PANTOPRAZOLE SODIUM 40 MG/10ML
40 INJECTION, POWDER, LYOPHILIZED, FOR SOLUTION INTRAVENOUS 2 TIMES DAILY
Status: DISCONTINUED | OUTPATIENT
Start: 2024-11-20 | End: 2024-11-23

## 2024-11-19 RX ORDER — FINASTERIDE 5 MG/1
5 TABLET, FILM COATED ORAL DAILY
Status: DISCONTINUED | OUTPATIENT
Start: 2024-11-20 | End: 2024-11-24 | Stop reason: HOSPADM

## 2024-11-19 RX ORDER — HYDRALAZINE HYDROCHLORIDE 20 MG/ML
20 INJECTION INTRAMUSCULAR; INTRAVENOUS ONCE
Status: COMPLETED | OUTPATIENT
Start: 2024-11-20 | End: 2024-11-19

## 2024-11-19 RX ORDER — ACETAMINOPHEN 325 MG/1
650 TABLET ORAL EVERY 6 HOURS PRN
Status: DISCONTINUED | OUTPATIENT
Start: 2024-11-19 | End: 2024-11-24 | Stop reason: HOSPADM

## 2024-11-19 RX ORDER — HYDROCODONE BITARTRATE AND ACETAMINOPHEN 5; 325 MG/1; MG/1
1 TABLET ORAL EVERY 8 HOURS PRN
Status: ON HOLD | COMMUNITY
End: 2024-11-24

## 2024-11-19 RX ADMIN — LIDOCAINE HYDROCHLORIDE 2 %: 20 JELLY TOPICAL at 22:30

## 2024-11-19 RX ADMIN — FLUCONAZOLE 200 MG: 100 TABLET ORAL at 21:33

## 2024-11-19 RX ADMIN — HYDRALAZINE HYDROCHLORIDE 20 MG: 20 INJECTION INTRAMUSCULAR; INTRAVENOUS at 23:37

## 2024-11-19 ASSESSMENT — ENCOUNTER SYMPTOMS
DIZZINESS: 0
ABDOMINAL PAIN: 0
HEADACHES: 0
CONSTIPATION: 0
DIARRHEA: 0
CHILLS: 0
BACK PAIN: 0
BLOOD IN STOOL: 0
NECK PAIN: 0
BLURRED VISION: 0
SHORTNESS OF BREATH: 0
VOMITING: 0
WHEEZING: 0
NAUSEA: 0
PALPITATIONS: 0
COUGH: 0
FEVER: 0
EYE PAIN: 0
DOUBLE VISION: 0

## 2024-11-19 NOTE — PROGRESS NOTES
Message received from film room that pt is again calling with questions about PAE with discussion with IR providers (see prior documentation in EMR). He has been seen in ER with negative infection workup last week. Also reports 15 lb weight gain with peripheral fluid build up that has resolved.     Yesterday 11/18/24 started experiencing gross hematuria and clots and [facility], noting fresh and old clots. He describes it looks like urinating pure blood with frequency every 30-45 minutes. He was [facility] in the [facility] and sent [facility], states he was told this was a normal side effect of PAE and should resolve in about 3-4 days. States he had no instructions about returning if unresolved. Has not seen his urologist but states he is waiting for a phone call from his urology office. Is still urinating blood and is understandably quite concerned by this as it is not improving.     Explained to pt that PAE is a treatment for gross hematuria and not an expected after effect, especially given the intervention was 3 weeks ago. The amount of blood he is describing is concerning and recommend he again present to ED or make an urgent call to his urologist's office for guidance from a specialty provider. Pt ended call due to urinary urgency, agrees to go to [facility] for [facility] again. 12 minutes of call time.

## 2024-11-20 ENCOUNTER — APPOINTMENT (OUTPATIENT)
Dept: RADIOLOGY | Facility: MEDICAL CENTER | Age: 89
End: 2024-11-20
Attending: STUDENT IN AN ORGANIZED HEALTH CARE EDUCATION/TRAINING PROGRAM
Payer: COMMERCIAL

## 2024-11-20 LAB
ALBUMIN SERPL BCP-MCNC: 3.7 G/DL (ref 3.2–4.9)
ALBUMIN/GLOB SERPL: 1.3 G/DL
ALP SERPL-CCNC: 114 U/L (ref 30–99)
ALT SERPL-CCNC: 34 U/L (ref 2–50)
ANION GAP SERPL CALC-SCNC: 12 MMOL/L (ref 7–16)
APTT PPP: 28.5 SEC (ref 24.7–36)
AST SERPL-CCNC: 27 U/L (ref 12–45)
BASOPHILS # BLD AUTO: 0.3 % (ref 0–1.8)
BASOPHILS # BLD: 0.02 K/UL (ref 0–0.12)
BILIRUB SERPL-MCNC: 0.8 MG/DL (ref 0.1–1.5)
BUN SERPL-MCNC: 20 MG/DL (ref 8–22)
CALCIUM ALBUM COR SERPL-MCNC: 9.4 MG/DL (ref 8.5–10.5)
CALCIUM SERPL-MCNC: 9.2 MG/DL (ref 8.5–10.5)
CHLORIDE SERPL-SCNC: 96 MMOL/L (ref 96–112)
CO2 SERPL-SCNC: 24 MMOL/L (ref 20–33)
CREAT SERPL-MCNC: 1.37 MG/DL (ref 0.5–1.4)
EOSINOPHIL # BLD AUTO: 0.03 K/UL (ref 0–0.51)
EOSINOPHIL NFR BLD: 0.4 % (ref 0–6.9)
ERYTHROCYTE [DISTWIDTH] IN BLOOD BY AUTOMATED COUNT: 44.5 FL (ref 35.9–50)
GFR SERPLBLD CREATININE-BSD FMLA CKD-EPI: 48 ML/MIN/1.73 M 2
GLOBULIN SER CALC-MCNC: 2.9 G/DL (ref 1.9–3.5)
GLUCOSE SERPL-MCNC: 101 MG/DL (ref 65–99)
HCT VFR BLD AUTO: 31.4 % (ref 42–52)
HCT VFR BLD AUTO: 31.5 % (ref 42–52)
HCT VFR BLD AUTO: 32.8 % (ref 42–52)
HCT VFR BLD AUTO: 33.9 % (ref 42–52)
HGB BLD-MCNC: 10.5 G/DL (ref 14–18)
HGB BLD-MCNC: 10.7 G/DL (ref 14–18)
HGB BLD-MCNC: 11 G/DL (ref 14–18)
HGB BLD-MCNC: 11.1 G/DL (ref 14–18)
IMM GRANULOCYTES # BLD AUTO: 0.12 K/UL (ref 0–0.11)
IMM GRANULOCYTES NFR BLD AUTO: 1.6 % (ref 0–0.9)
INR PPP: 1.07 (ref 0.87–1.13)
LYMPHOCYTES # BLD AUTO: 1.99 K/UL (ref 1–4.8)
LYMPHOCYTES NFR BLD: 26.9 % (ref 22–41)
MCH RBC QN AUTO: 30 PG (ref 27–33)
MCHC RBC AUTO-ENTMCNC: 33.5 G/DL (ref 32.3–36.5)
MCV RBC AUTO: 89.4 FL (ref 81.4–97.8)
MONOCYTES # BLD AUTO: 0.6 K/UL (ref 0–0.85)
MONOCYTES NFR BLD AUTO: 8.1 % (ref 0–13.4)
NEUTROPHILS # BLD AUTO: 4.63 K/UL (ref 1.82–7.42)
NEUTROPHILS NFR BLD: 62.7 % (ref 44–72)
NRBC # BLD AUTO: 0 K/UL
NRBC BLD-RTO: 0 /100 WBC (ref 0–0.2)
OSMOLALITY SERPL: 274 MOSM/KG H2O (ref 278–298)
PLATELET # BLD AUTO: 373 K/UL (ref 164–446)
PMV BLD AUTO: 8.5 FL (ref 9–12.9)
POTASSIUM SERPL-SCNC: 4.8 MMOL/L (ref 3.6–5.5)
PROT SERPL-MCNC: 6.6 G/DL (ref 6–8.2)
PROTHROMBIN TIME: 13.9 SEC (ref 12–14.6)
RBC # BLD AUTO: 3.67 M/UL (ref 4.7–6.1)
SODIUM SERPL-SCNC: 132 MMOL/L (ref 135–145)
TSH SERPL DL<=0.005 MIU/L-ACNC: 2.21 UIU/ML (ref 0.38–5.33)
WBC # BLD AUTO: 7.4 K/UL (ref 4.8–10.8)

## 2024-11-20 PROCEDURE — 83930 ASSAY OF BLOOD OSMOLALITY: CPT

## 2024-11-20 PROCEDURE — 700111 HCHG RX REV CODE 636 W/ 250 OVERRIDE (IP): Performed by: STUDENT IN AN ORGANIZED HEALTH CARE EDUCATION/TRAINING PROGRAM

## 2024-11-20 PROCEDURE — 85018 HEMOGLOBIN: CPT

## 2024-11-20 PROCEDURE — 36415 COLL VENOUS BLD VENIPUNCTURE: CPT

## 2024-11-20 PROCEDURE — 71045 X-RAY EXAM CHEST 1 VIEW: CPT

## 2024-11-20 PROCEDURE — A9270 NON-COVERED ITEM OR SERVICE: HCPCS | Performed by: STUDENT IN AN ORGANIZED HEALTH CARE EDUCATION/TRAINING PROGRAM

## 2024-11-20 PROCEDURE — 96375 TX/PRO/DX INJ NEW DRUG ADDON: CPT

## 2024-11-20 PROCEDURE — 80053 COMPREHEN METABOLIC PANEL: CPT

## 2024-11-20 PROCEDURE — 770006 HCHG ROOM/CARE - MED/SURG/GYN SEMI*

## 2024-11-20 PROCEDURE — 99233 SBSQ HOSP IP/OBS HIGH 50: CPT | Performed by: INTERNAL MEDICINE

## 2024-11-20 PROCEDURE — 85014 HEMATOCRIT: CPT | Mod: 91

## 2024-11-20 PROCEDURE — 85025 COMPLETE CBC W/AUTO DIFF WBC: CPT

## 2024-11-20 PROCEDURE — 700102 HCHG RX REV CODE 250 W/ 637 OVERRIDE(OP): Performed by: STUDENT IN AN ORGANIZED HEALTH CARE EDUCATION/TRAINING PROGRAM

## 2024-11-20 RX ADMIN — PANTOPRAZOLE SODIUM 40 MG: 40 INJECTION, POWDER, FOR SOLUTION INTRAVENOUS at 01:27

## 2024-11-20 RX ADMIN — HYDRALAZINE HYDROCHLORIDE 10 MG: 20 INJECTION INTRAMUSCULAR; INTRAVENOUS at 18:18

## 2024-11-20 RX ADMIN — PANTOPRAZOLE SODIUM 40 MG: 40 INJECTION, POWDER, FOR SOLUTION INTRAVENOUS at 17:30

## 2024-11-20 RX ADMIN — FINASTERIDE 5 MG: 5 TABLET, FILM COATED ORAL at 06:26

## 2024-11-20 SDOH — ECONOMIC STABILITY: TRANSPORTATION INSECURITY
IN THE PAST 12 MONTHS, HAS THE LACK OF TRANSPORTATION KEPT YOU FROM MEDICAL APPOINTMENTS OR FROM GETTING MEDICATIONS?: NO

## 2024-11-20 SDOH — ECONOMIC STABILITY: TRANSPORTATION INSECURITY
IN THE PAST 12 MONTHS, HAS LACK OF RELIABLE TRANSPORTATION KEPT YOU FROM MEDICAL APPOINTMENTS, MEETINGS, WORK OR FROM GETTING THINGS NEEDED FOR DAILY LIVING?: NO

## 2024-11-20 ASSESSMENT — LIFESTYLE VARIABLES
HAVE PEOPLE ANNOYED YOU BY CRITICIZING YOUR DRINKING: NO
CONSUMPTION TOTAL: NEGATIVE
HOW MANY TIMES IN THE PAST YEAR HAVE YOU HAD 5 OR MORE DRINKS IN A DAY: 0
ON A TYPICAL DAY WHEN YOU DRINK ALCOHOL HOW MANY DRINKS DO YOU HAVE: 0
TOTAL SCORE: 0
TOTAL SCORE: 0
ALCOHOL_USE: NO
DOES PATIENT WANT TO STOP DRINKING: NO
EVER HAD A DRINK FIRST THING IN THE MORNING TO STEADY YOUR NERVES TO GET RID OF A HANGOVER: NO
EVER FELT BAD OR GUILTY ABOUT YOUR DRINKING: NO
AVERAGE NUMBER OF DAYS PER WEEK YOU HAVE A DRINK CONTAINING ALCOHOL: 0
TOTAL SCORE: 0
HAVE YOU EVER FELT YOU SHOULD CUT DOWN ON YOUR DRINKING: NO

## 2024-11-20 ASSESSMENT — ENCOUNTER SYMPTOMS
CHILLS: 0
EYES NEGATIVE: 1
MYALGIAS: 1
HEADACHES: 0
ABDOMINAL PAIN: 1
RESPIRATORY NEGATIVE: 1
WEAKNESS: 1
CARDIOVASCULAR NEGATIVE: 1
FEVER: 0
PSYCHIATRIC NEGATIVE: 1
DIZZINESS: 0

## 2024-11-20 ASSESSMENT — PAIN DESCRIPTION - PAIN TYPE: TYPE: ACUTE PAIN

## 2024-11-20 ASSESSMENT — COPD QUESTIONNAIRES
DURING THE PAST 4 WEEKS HOW MUCH DID YOU FEEL SHORT OF BREATH: NONE/LITTLE OF THE TIME
DO YOU EVER COUGH UP ANY MUCUS OR PHLEGM?: NO/ONLY WITH OCCASIONAL COLDS OR INFECTIONS
COPD SCREENING SCORE: 0
HAVE YOU SMOKED AT LEAST 100 CIGARETTES IN YOUR ENTIRE LIFE: NO/DON'T KNOW

## 2024-11-20 ASSESSMENT — SOCIAL DETERMINANTS OF HEALTH (SDOH)
WITHIN THE PAST 12 MONTHS, YOU WORRIED THAT YOUR FOOD WOULD RUN OUT BEFORE YOU GOT THE MONEY TO BUY MORE: NEVER TRUE
IN THE PAST 12 MONTHS, HAS THE ELECTRIC, GAS, OIL, OR WATER COMPANY THREATENED TO SHUT OFF SERVICE IN YOUR HOME?: NO
WITHIN THE LAST YEAR, HAVE YOU BEEN AFRAID OF YOUR PARTNER OR EX-PARTNER?: NO
WITHIN THE LAST YEAR, HAVE YOU BEEN KICKED, HIT, SLAPPED, OR OTHERWISE PHYSICALLY HURT BY YOUR PARTNER OR EX-PARTNER?: NO
WITHIN THE LAST YEAR, HAVE TO BEEN RAPED OR FORCED TO HAVE ANY KIND OF SEXUAL ACTIVITY BY YOUR PARTNER OR EX-PARTNER?: NO
WITHIN THE PAST 12 MONTHS, THE FOOD YOU BOUGHT JUST DIDN'T LAST AND YOU DIDN'T HAVE MONEY TO GET MORE: NEVER TRUE
WITHIN THE LAST YEAR, HAVE YOU BEEN HUMILIATED OR EMOTIONALLY ABUSED IN OTHER WAYS BY YOUR PARTNER OR EX-PARTNER?: NO

## 2024-11-20 ASSESSMENT — PATIENT HEALTH QUESTIONNAIRE - PHQ9
1. LITTLE INTEREST OR PLEASURE IN DOING THINGS: NOT AT ALL
SUM OF ALL RESPONSES TO PHQ9 QUESTIONS 1 AND 2: 0
2. FEELING DOWN, DEPRESSED, IRRITABLE, OR HOPELESS: NOT AT ALL

## 2024-11-20 ASSESSMENT — FIBROSIS 4 INDEX: FIB4 SCORE: 1.03

## 2024-11-20 NOTE — ED PROVIDER NOTES
ED Provider Note    CHIEF COMPLAINT  Chief Complaint   Patient presents with    Blood in Urine       EXTERNAL RECORDS REVIEWED  Outpatient Notes pain medicine on 11/2/2024 for dysuria following prostate embolization.  Patient was taking Azo and Cipro without improving symptoms    HPI/ROS  LIMITATION TO HISTORY   Select: : None  OUTSIDE HISTORIAN(S):  Family reports that the patient started having hematuria 2 weeks after his prostate artery embolization    Felipe Chowdary is a 89 y.o. male who presents with gross hematuria and clots passing.  Patient continues to endorse burning with urination.  Patient reports some mild lower abdominal discomfort.  Patient denies fevers chills body aches or sweats.  Patient reports that he had been taking his Cipro and Azo and has completed his courses.  Patient and family report a remote history of bloody stools    PAST MEDICAL HISTORY       SURGICAL HISTORY  patient denies any surgical history    FAMILY HISTORY  History reviewed. No pertinent family history.    SOCIAL HISTORY  Social History     Tobacco Use    Smoking status: Former    Smokeless tobacco: Never   Substance and Sexual Activity    Alcohol use: Not on file    Drug use: Not on file    Sexual activity: Not on file       CURRENT MEDICATIONS  Home Medications       Reviewed by Qiana Garg R.N. (Registered Nurse) on 11/19/24 at 1854  Med List Status: Not Addressed     Medication Last Dose Status   aspirin 81 MG EC tablet  Active   ciprofloxacin (CIPRO) 750 MG Tab  Active   finasteride (PROSCAR) 5 MG Tab  Active   losartan (COZAAR) 25 MG Tab  Active   pantoprazole (PROTONIX) 40 MG Tablet Delayed Response  Active   terazosin (HYTRIN) 2 MG Cap  Active                    ALLERGIES  Allergies   Allergen Reactions    Atorvastatin      Does not know    Cephalosporins      Does not know    Erythromycin Swelling    Penicillins Swelling    Sulfa Drugs Shortness of Breath    Atenolol      Does not know       PHYSICAL EXAM  VITAL  SIGNS: BP (!) 184/88   Pulse 76   Temp 36.4 °C (97.6 °F) (Temporal)   Resp 16   Ht 1.829 m (6')   Wt 90.3 kg (199 lb 1.2 oz)   SpO2 91%   BMI 27.00 kg/m²    Vitals and nursing note reviewed.   Constitutional:       Comments: Patient is lying in bed supine, pleasant, conversant, speaking in complete sentences   HENT:      Head: Normocephalic and atraumatic.   Eyes:      Extraocular Movements: Extraocular movements intact.      Conjunctiva/sclera: Conjunctivae normal.      Pupils: Pupils are equal, round, and reactive to light.   Cardiovascular:      Pulses: Normal pulses.      Comments: HR 92  Pulmonary:      Effort: Pulmonary effort is normal. No respiratory distress.   Abdominal:      Comments: Abdomen is soft, mild lower abdominal tenderness to palpation  Musculoskeletal:         General: No swelling. Normal range of motion.      Cervical back: Normal range of motion. No rigidity.   Skin:     General: Skin is warm and dry.      Capillary Refill: Capillary refill takes less than 2 seconds.   Neurological:      Mental Status: Alert.         COURSE & MEDICAL DECISION MAKING    ASSESSMENT, COURSE AND PLAN  Care Narrative: Bladder scan has been ordered to evaluate for urinary retention.  Urinalysis demonstrates budding yeast but no evidence of bacterial infection.  Patient does have gross hematuria.  CBC to evaluate for anemia given report of bloody stools.  Patient will be referred to gastroenterology should those results be normal.  Fluconazole given for yeast urinary tract infection.  Will consult urology following bladder scan.    Electronically signed by: Chris Ortiz M.D., 11/19/2024 9:12 PM    Patient with worsening H&H.  Hemoglobin 10 from 13.  Patient should be evaluated inpatient by interventional radiology, urology, gastroenterology.  Patient also hyponatremic.  Patient has been on urinalysis.  Patient will be given fluconazole.  Dr. Tiwari of interventional radiology will come to the bedside  tomorrow.  Dr. Hernandez's partner also reports that patient will come to the bedside and evaluate the patient from a urologic perspective.  I do believe patient requires consultation from GI as well given reports of black stools.    This dictation has been created using voice recognition software. I am continuously working with the software to minimize the number of voice recognition errors and I have made every attempt to manually correct the errors within my dictation. However errors  related to this voice recognition software may still exist and should be interpreted within the appropriate context.     Electronically signed by: Chris Ortiz M.D., 11/19/2024 11:05 PM        FINAL DIAGNOSIS  1. Acute blood loss anemia    2. Candida cystitis    3. Hyponatremia    4. Gross hematuria         Electronically signed by: Chris Ortiz M.D., 11/19/2024 9:05 PM

## 2024-11-20 NOTE — HOSPITAL COURSE
"Mr. Felipe Chowdary \"eBau\" is a 89-year-old male with past medical history of hypertension, BPH, and recent prostate artery embolization 10/31/2024 that presents with 1 day history of hematuria with clots and  history of melena.     Patient states that after his IR embolization he's been having burning sensation and pain after his procedure as well as hematuria and difficulty urinating. He was given Norco and told to take ibuprofen which he has been taking regularly. At some point he noticed dark stools, he also notes that he has been taking oral iron for the last 1 month.     In the ED, BP 140s to 210s/70s to 120s, HR 70s to 90s, RR 16-24, saturating 89 to 94% on room air.  Received fluconazole 200 mg p.o. x 1.  WBC 8.1, hemoglobin 10.3, , sodium 127, potassium 4.9, BUN 23, creatinine 1.35, alk phos 110.  UA red, bloody, WBC 6-10, RBC greater than 100, few bacteria, budding yeast present.    During his hospitalization, patient's hemoglobin level continued to stay steady at 11, 10.5, 10.7.  No bloody stool or melena noted since admission of which we will defer GI from being consulted at this time.  IR Dr. Tiwari consulted and case discussed with urology, plan for left prostate artery embolization   "

## 2024-11-20 NOTE — H&P
Hospital Medicine History & Physical Note    Date of Service  11/19/2024    Primary Care Physician  Farooq Vargas M.D.    Consultants  urology and IR    Specialist Names: Dr. Tiwari, Dr. Hernandez    Code Status  Full Code    Chief Complaint  Chief Complaint   Patient presents with    Blood in Urine       History of Presenting Illness  Patient is a 89-year-old male with past medical history of hypertension, BPH, and recent prostate artery embolization 10/31/2024 that presents with 1 day history of hematuria with clots and 10-day history of melena.     Patient states that after their IR embolization they have been having a burning sensation.  States that after their procedure they were having pain, and [facility] and were told to take either Tylenol or ibuprofen.  Patient states that they have been taking ibuprofen daily for almost 2 weeks now.  States that they noticed melena approximately 10 days ago.     In the ED, BP 140s to 210s/70s to 120s, HR 70s to 90s, RR 16-24, saturating 89 to 94% on room air.  Received fluconazole 200 mg p.o. x 1.  WBC 8.1, hemoglobin 10.3, , sodium 127, potassium 4.9, BUN 23, creatinine 1.35, alk phos 110.  UA red, bloody, WBC 6-10, RBC greater than 100, few bacteria, budding yeast present..     I discussed the plan of care with patient and family.    Review of Systems  Review of Systems   Constitutional:  Negative for chills and fever.   HENT:  Negative for ear pain.    Eyes:  Negative for blurred vision, double vision and pain.   Respiratory:  Negative for cough, shortness of breath and wheezing.    Cardiovascular:  Negative for chest pain, palpitations and leg swelling.   Gastrointestinal:  Positive for melena. Negative for abdominal pain, blood in stool, constipation, diarrhea, nausea and vomiting.   Genitourinary:  Positive for dysuria and hematuria. Negative for frequency.   Musculoskeletal:  Negative for back pain, joint pain and neck pain.    Neurological:  Negative for dizziness and headaches.       Past Medical History   has no past medical history on file.    Surgical History   has no past surgical history on file.     Family History  family history is not on file.   Family history reviewed with patient. There is no family history that is pertinent to the chief complaint.     Social History   reports that he has quit smoking. He has never used smokeless tobacco.    Allergies  Allergies   Allergen Reactions    Atorvastatin      Does not know    Cephalosporins      Does not know    Erythromycin Swelling    Penicillins Swelling    Sulfa Drugs Shortness of Breath    Atenolol      Does not know       Medications  Prior to Admission Medications   Prescriptions Last Dose Informant Patient Reported? Taking?   aspirin 81 MG EC tablet   Yes No   Sig: Take 81 mg by mouth every day.   ciprofloxacin (CIPRO) 750 MG Tab   No No   Sig: Take 1 Tablet by mouth 2 times a day.   finasteride (PROSCAR) 5 MG Tab   Yes No   Sig: Take 5 mg by mouth every evening.   losartan (COZAAR) 25 MG Tab   Yes No   Sig: Take 25 mg by mouth every evening.   pantoprazole (PROTONIX) 40 MG Tablet Delayed Response   Yes No   Sig: Take 40 mg by mouth every day.   terazosin (HYTRIN) 2 MG Cap   Yes No   Sig: Take 2 mg by mouth every evening.      Facility-Administered Medications: None       Physical Exam  Temp:  [36.4 °C (97.6 °F)] 36.4 °C (97.6 °F)  Pulse:  [76-92] 85  Resp:  [16-24] 20  BP: (143-216)/() 182/84  SpO2:  [91 %-94 %] 94 %  Blood Pressure : (!) 184/88   Temperature: 36.4 °C (97.6 °F)   Pulse: 76   Respiration: 16   Pulse Oximetry: 91 %       Physical Exam  Vitals reviewed.   Constitutional:       General: He is not in acute distress.     Appearance: Normal appearance. He is not ill-appearing, toxic-appearing or diaphoretic.   HENT:      Head: Normocephalic and atraumatic.      Mouth/Throat:      Mouth: Mucous membranes are moist.      Pharynx: No oropharyngeal exudate or  "posterior oropharyngeal erythema.   Eyes:      General: No scleral icterus.     Extraocular Movements: Extraocular movements intact.      Conjunctiva/sclera: Conjunctivae normal.   Cardiovascular:      Rate and Rhythm: Normal rate and regular rhythm.      Heart sounds: Normal heart sounds. No murmur heard.     No friction rub. No gallop.   Pulmonary:      Effort: Pulmonary effort is normal. No respiratory distress.      Breath sounds: Normal breath sounds. No stridor. No wheezing, rhonchi or rales.   Abdominal:      General: Abdomen is flat. There is no distension.      Palpations: Abdomen is soft. There is no mass.      Tenderness: There is no abdominal tenderness. There is no guarding or rebound.      Hernia: No hernia is present.   Genitourinary:     Comments: Ayala with CBI in place  Musculoskeletal:         General: No swelling or tenderness. Normal range of motion.      Cervical back: Neck supple. No rigidity.      Right lower leg: Edema present.      Left lower leg: Edema present.      Comments: Bilateral lower extremity 1+ pitting edema to the shins   Lymphadenopathy:      Cervical: No cervical adenopathy.   Skin:     General: Skin is warm and dry.      Coloration: Skin is not jaundiced.   Neurological:      Mental Status: He is alert and oriented to person, place, and time. Mental status is at baseline.      Cranial Nerves: No cranial nerve deficit.         Laboratory:  Recent Labs     11/19/24 2124   WBC 8.1   RBC 3.35*   HEMOGLOBIN 10.3*   HEMATOCRIT 30.1*   MCV 89.9   MCH 30.7   MCHC 34.2   RDW 44.0   PLATELETCT 361   MPV 8.7*     Recent Labs     11/19/24 2124   SODIUM 127*   POTASSIUM 4.9   CHLORIDE 94*   CO2 22   GLUCOSE 104*   BUN 23*   CREATININE 1.35   CALCIUM 8.5     Recent Labs     11/19/24 2124   ALTSGPT 33   ASTSGOT 23   ALKPHOSPHAT 110*   TBILIRUBIN 0.7   GLUCOSE 104*         No results for input(s): \"NTPROBNP\" in the last 72 hours.      No results for input(s): \"TROPONINT\" in the last 72 " hours.    Imaging:  IR-CONSULT AND TREAT    (Results Pending)   DX-CHEST-PORTABLE (1 VIEW)    (Results Pending)       no X-Ray or EKG requiring interpretation    Assessment/Plan:  Justification for Admission Status  I anticipate this patient is appropriate for observation status at this time because hematuria requiring IR and urology consultation, and likely GI consultation.    Patient will need a TELEMETRY bed on MEDICAL service .  The need is secondary to active bleed and acute hypoxic respiratory failure.    * Hematuria- (present on admission)  Assessment & Plan  Patient with recent prostate artery embolization with IR, Dr. Tiwari, 10/31/2024.  Now with new onset hematuria since yesterday.  [facility] and [facility]. UA red, bloody, WBC 6-10, RBC greater than 100, few bacteria, budding yeast present.  Recent completed course of ciprofloxacin for UTI.    -IR consulted in the ED; Dr. Tiwari to see patient in the morning  -Urology consulted in the ED; Dr. Hernandez to see patient in the morning  -Continue CBI  -H&H q8h  -Transfuse PRN Hgb<7 and PLT<50  -Received fluconazole 200 mg p.o. x 1    Hyponatremia- (present on admission)  Assessment & Plan  Sodium 127.    -Daily BMP  -Ordered TSH, serum osmolality, urine osmolality, urine sodium    BPH (benign prostatic hyperplasia)- (present on admission)  Assessment & Plan  -Continue home finasteride  -Hold home terazosin in the setting of active bleed    Acute hypoxic respiratory failure (HCC)- (present on admission)  Assessment & Plan  Patient saturating 89% on room air with respiratory rate up to 24.  Denies shortness of breath at this time.  Likely in the setting of active bleed and hypertensive urgency.    -Telemetry  -RT protocol  -Supplemental oxygen as needed  -Order CXR  -Incentive spirometry    Upper GI bleed- (present on admission)  Assessment & Plan  Patient states that they have been taking ibuprofen for the past 2 weeks after their  recent IR embolization.  Has been having worsening melena over the past 10 days.  Lower GI: Diverticulosis, AVM, malignancy, hemorrhoids    -Day team to consult GI in the AM  -NPO  -Avoid NSAIDs  -2 large bore IVs  -H&H q8h  -Transfuse PRN Hgb<7 and PLT<50  -PPI 40mg IV bid    Hypertensive urgency- (present on admission)  Assessment & Plan  Patient with SBP into to the 210s.  States that they have not taken their antihypertensives at home for several days now.    -Hold home antihypertensives in the setting of acute blood loss, hematuria and possible upper GI bleed  -Hydralazine as needed        VTE prophylaxis: SCDs/TEDs

## 2024-11-20 NOTE — ASSESSMENT & PLAN NOTE
Patient saturating 89% on room air with respiratory rate up to 24.   -RT protocol  -Supplemental oxygen as needed  -Incentive spirometry  Resolved, now on RA

## 2024-11-20 NOTE — PROGRESS NOTES
"Hospital Medicine Daily Progress Note    Date of Service  11/20/2024    Chief Complaint  Felipe Chowdary is a 89 y.o. male admitted 11/19/2024 with hematuria and melena    Hospital Course  Mr. Felipe Chowdary \"Everardo" is a 89-year-old male with past medical history of hypertension, BPH, and recent prostate artery embolization 10/31/2024 that presents with 1 day history of hematuria with clots and 10-day history of melena.     Patient states that after his IR embolization he's been having burning sensation.  States that after his procedure he was having pain, and [facility] and were told to take either Tylenol or ibuprofen.  Patient states that they have been taking ibuprofen daily for almost 2 weeks now.  States that they noticed melena approximately 10 days ago.     In the ED, BP 140s to 210s/70s to 120s, HR 70s to 90s, RR 16-24, saturating 89 to 94% on room air.  Received fluconazole 200 mg p.o. x 1.  WBC 8.1, hemoglobin 10.3, , sodium 127, potassium 4.9, BUN 23, creatinine 1.35, alk phos 110.  UA red, bloody, WBC 6-10, RBC greater than 100, few bacteria, budding yeast present.    During his hospitalization, patient's hemoglobin level continued to stay steady at 11, 10.5, 10.7.  No bloody stool or melena noted since admission of which we will defer GI from being consulted at this time.  IR Dr. Tiwari consulted due to anticipated right prostate artery embolization as this might likely be the cause of his bleed.  Will continue to monitor patient's blood level, hematuria, and replace blood as per protocol.    Interval Problem Update  Patient seen and examined.  Patient's grandchildren was at bedside.  Discussed with patient and family regarding plan of care.  Will pursue IR embolization of right prostate artery as this might be the culprit to the bleed.  Continue to monitor patient's H/H and transfuse per protocol.  Plan of care: anticipate for in IR embolization; monitor H/H; monitor for " any signs of rectal bleed or melena  Disposition: Patient switched to inpatient status as he is anticipated to stay through 2-3 midnights for management of hematuria and possible melena  Lab work: Reviewed; expected  VSS at this time    I have discussed this patient's plan of care and discharge plan at IDT rounds today with Case Management, Nursing, Nursing leadership, and other members of the IDT team.    Consultants/Specialty  urology and Interventional radiology    Code Status  Full Code    Disposition  The patient is not medically cleared for discharge to home or a post-acute facility.  Anticipate discharge to: home with close outpatient follow-up    I have placed the appropriate orders for post-discharge needs.    Review of Systems  Review of Systems   Constitutional:  Positive for malaise/fatigue.   HENT: Negative.     Eyes: Negative.    Respiratory: Negative.     Cardiovascular: Negative.    Gastrointestinal:  Positive for abdominal pain.   Genitourinary:  Positive for dysuria and hematuria.   Musculoskeletal:  Positive for myalgias.   Skin: Negative.    Neurological:  Positive for weakness.   Endo/Heme/Allergies: Negative.    Psychiatric/Behavioral: Negative.          Physical Exam  Temp:  [36.4 °C (97.6 °F)-37.2 °C (98.9 °F)] 37.2 °C (98.9 °F)  Pulse:  [76-99] 82  Resp:  [16-24] 18  BP: (143-216)/() 163/77  SpO2:  [89 %-95 %] 93 %    Physical Exam  Vitals and nursing note reviewed.   HENT:      Head: Normocephalic.      Nose: Nose normal.      Mouth/Throat:      Mouth: Mucous membranes are moist.      Pharynx: Oropharynx is clear.   Eyes:      Pupils: Pupils are equal, round, and reactive to light.   Cardiovascular:      Rate and Rhythm: Normal rate and regular rhythm.      Pulses: Normal pulses.      Heart sounds: Normal heart sounds.   Pulmonary:      Effort: Pulmonary effort is normal.      Breath sounds: Normal breath sounds.   Abdominal:      General: Abdomen is flat.      Palpations: Abdomen is  soft.   Musculoskeletal:         General: Swelling and tenderness present.      Cervical back: Normal range of motion and neck supple.      Right lower leg: Edema present.      Left lower leg: Edema present.   Skin:     General: Skin is dry.      Capillary Refill: Capillary refill takes 2 to 3 seconds.   Neurological:      Mental Status: He is alert. Mental status is at baseline.      Motor: Weakness present.         Fluids  No intake or output data in the 24 hours ending 11/20/24 1254     Laboratory  Recent Labs     11/19/24 2124 11/20/24  0113 11/20/24  0505 11/20/24  0849   WBC 8.1 7.4  --   --    RBC 3.35* 3.67*  --   --    HEMOGLOBIN 10.3* 11.0* 10.5* 10.7*   HEMATOCRIT 30.1* 32.8* 31.5* 31.4*   MCV 89.9 89.4  --   --    MCH 30.7 30.0  --   --    MCHC 34.2 33.5  --   --    RDW 44.0 44.5  --   --    PLATELETCT 361 373  --   --    MPV 8.7* 8.5*  --   --      Recent Labs     11/19/24 2124 11/20/24 0113   SODIUM 127* 132*   POTASSIUM 4.9 4.8   CHLORIDE 94* 96   CO2 22 24   GLUCOSE 104* 101*   BUN 23* 20   CREATININE 1.35 1.37   CALCIUM 8.5 9.2     Recent Labs     11/19/24 2124   APTT 28.5   INR 1.07               Imaging  DX-CHEST-PORTABLE (1 VIEW)   Final Result         1.  Pulmonary edema and/or infiltrates.   2.  Cardiomegaly   3.  Atherosclerosis      IR-EMBOLIZATION    (Results Pending)        Assessment/Plan  * Hematuria- (present on admission)  Assessment & Plan  Patient with recent prostate artery embolization with IR, Dr. Tiwari, 10/31/2024.  Now with new onset hematuria since yesterday.  [facility]. UA red, bloody, WBC 6-10, RBC greater than 100, few bacteria, budding yeast present.  Recent completed course of ciprofloxacin for UTI.    -IR consulted in the ED; Dr. Tiwari to see patient in the morning  -Urology consulted in the ED; Dr. Hernandez to see patient in the morning  -Continue CBI  -H&H q8h  -Transfuse PRN Hgb<7 and PLT<50  -Received fluconazole 200 mg  p.o. x 1    Hyponatremia- (present on admission)  Assessment & Plan  Sodium 127.    -Daily BMP  -Ordered TSH, serum osmolality, urine osmolality, urine sodium    BPH (benign prostatic hyperplasia)- (present on admission)  Assessment & Plan  -Continue home finasteride  -Hold home terazosin in the setting of active bleed    Acute hypoxic respiratory failure (HCC)- (present on admission)  Assessment & Plan  Patient saturating 89% on room air with respiratory rate up to 24.  Denies shortness of breath at this time.  Likely in the setting of active bleed and hypertensive urgency.    -Telemetry  -RT protocol  -Supplemental oxygen as needed  -Order CXR  -Incentive spirometry    Upper GI bleed- (present on admission)  Assessment & Plan  Patient states that they have been taking ibuprofen for the past 2 weeks after their recent IR embolization.  Has been having worsening melena over the past 10 days.  Lower GI: Diverticulosis, AVM, malignancy, hemorrhoids    -Hold consult to GI for now as bleed likely from prostate artery; reevaluate after embolization  -NPO  -Avoid NSAIDs  -2 large bore IVs  -H&H q8h  -Transfuse PRN Hgb<7 and PLT<50  -PPI 40mg IV bid    Hypertensive urgency- (present on admission)  Assessment & Plan  Patient with SBP into to the 210s.  States that they have not taken their antihypertensives at home for several days now.    -Hold home antihypertensives in the setting of acute blood loss, hematuria and possible upper GI bleed  -Hydralazine as needed         VTE prophylaxis:   SCDs/TEDs      I have performed a physical exam and reviewed and updated ROS and Plan today (11/20/2024). In review of yesterday's note (11/19/2024), there are no changes except as documented above.      ISloan DNP performed a substantiated portion of the service face-to-face with same patient on the same date of service INDEPENDENTLY FROM THE MD AND ASSESSMENT, EXAMINATION, DISCUSSION PLAN OF CARE FOR 23  MINUTES.  I was personally involved in reviewing and conducting the medical decision making, including the information as described above.

## 2024-11-20 NOTE — ASSESSMENT & PLAN NOTE
Patient with recent prostate artery embolization with IR, Dr. Tiwari, 10/31/2024.  Now with new onset hematuria.  [facility] and [facility]. UA red, bloody, WBC 6-10, RBC greater than 100, few bacteria, budding yeast present.  Recent completed course of ciprofloxacin for UTI.  -IR consulted in the ED; plan for left PAE tomorrow, NPO at [facility]  -Urology consulted and following  - s/p IR prostate artery emobolization 11/22  - urine is clear, TOV today with plan to remove medrano if he passes   -Received fluconazole 200 mg p.o. x 1  - appears to be resolved at this time

## 2024-11-20 NOTE — PROGRESS NOTES
4 Eyes Skin Assessment Completed.    Head WDL  Ears WDL  Nose WDL  Mouth WDL  Neck WDL  Breast/Chest WDL  Shoulder Blades WDL  Spine WDL  (R) Arm/Elbow/Hand Bruising  (L) Arm/Elbow/Hand Bruising  Abdomen WDL,   Groin WDL  Scrotum/Coccyx/Buttocks Redness and Blanching  (R) Leg Edema  (L) Leg Edema  (R) Heel/Foot/Toe Redness and Blanching  (L) Heel/Foot/Toe Redness and Blanching          Devices In Places Tele Box, Pulse Ox, and Ayala      Interventions In Place NC W/Ear Foams and Pillows    Possible Skin Injury No    Pictures Uploaded Into Epic N/A  Wound Consult Placed N/A  RN Wound Prevention Protocol Ordered No

## 2024-11-20 NOTE — ED TRIAGE NOTES
Felipe Chowdary  89 y.o. male    Chief Complaint   Patient presents with    Blood in Urine     Pt arrives with complaints of hematuria x2 day ago with urinary frequency and burning. Pt had prostate artery embolization in IR on 10/31/24. Pt was discharged on Cipro. [facility] and discharged [facility]. Pt and family worried about large amount of clots passing. Denies dizziness/lightheadedness. Reports feeling weak. Was started on hydrocodone last night in ED. Denies fevers.     Pt ambulatory with steady gait in triage.    Pt 81mg ASA daily. Pt's family also concerned about dark stools.     Vitals:    11/19/24 1845   BP: (!) 143/77   Pulse: 92   Resp: 18   Temp: 36.4 °C (97.6 °F)   SpO2: 94%       Triage process explained to patient, apologized for wait time, and returned to lobby.  Pt informed to notify staff of any change in condition.

## 2024-11-20 NOTE — CARE PLAN
The patient is Watcher - Medium risk of patient condition declining or worsening    Shift Goals  Clinical Goals: tele monitoring, Continuous CBI, monitoring H&H, GI/Urology/IR consult in AM  Patient Goals: feel better, go home  Family Goals: N/A    Progress made toward(s) clinical / shift goals:    Problem: Knowledge Deficit - Standard  Goal: Patient and family/care givers will demonstrate understanding of plan of care, disease process/condition, diagnostic tests and medications  Description: Target End Date:  1-3 days or as soon as patient condition allows    1.  Patient and family/caregiver oriented to unit, equipment, visitation policy and means for communicating concern  2.  Complete/review Learning Assessment  3.  Assess knowledge level of disease process/condition, treatment plan, diagnostic tests and medications  4.  Explain disease process/condition, treatment plan, diagnostic tests and medications  Outcome: Progressing     Problem: Fall Risk  Goal: Patient will remain free from falls  Description: Target End Date:  Prior to discharge or change in level of care    1.  Assess for fall risk factors  2.  Implement fall precautions  Outcome: Progressing     Problem: Hemodynamics  Goal: Patient's hemodynamics, fluid balance and neurologic status will be stable or improve  Description: Target End Date:  Prior to discharge or change in level of care    1.  Monitor vital signs, pulse oximetry and cardiac monitor per provider order and/or policy  2.  Maintain blood pressure per provider order  3.  Hemodynamic monitoring per provider order  4.  Manage IV fluids and IV infusions  5.  Monitor intake and output  6.  Daily weights per unit policy or provider order  7.  Assess peripheral pulses and capillary refill  8.  Assess color and body temperature  9.  Position patient for maximum circulation/cardiac output  10. Monitor for signs/symptoms of excessive bleeding  11. Assess mental status, restlessness and changes in  level of consciousness  12. Monitor temperature and report fever or hypothermia to provider immediately. Consideration of targeted temperature management.  Outcome: Progressing     Problem: Respiratory  Goal: Patient will achieve/maintain optimum respiratory ventilation and gas exchange  Description: Target End Date:  Prior to discharge or change in level of care    1.  Assess and monitor rate, rhythm, depth and effort of respiration  2.  Breath sounds assessed qshift and/or as needed  3.  Assess O2 saturation, administer/titrate oxygen as ordered  4.  Position patient for maximum ventilatory efficiency  5.  Turn, cough, and deep breath with splinting to improve effectiveness  6.  Collaborate with RT to administer medication/treatments per order  7.  Encourage use of incentive spirometer and encourage patient to cough after use and utilize splinting techniques if applicable  8.  Airway suctioning  9.  Monitor sputum production for changes in color, consistency and frequency  10. Perform frequent oral hygiene  11. Alternate physical activity with rest periods  Outcome: Progressing     Problem: Fluid Volume  Goal: Fluid volume balance will be maintained  Description: Target End Date:  Prior to discharge or change in level of care    1.  Monitor intake and output as ordered  2.  Promote oral intake as appropriate  3.  Report inadequate intake or output to physician  4.  Administer IV therapy as ordered  5.  Weights per provider order  6.  Assess for signs and symptoms of bleeding  7.  Monitor for signs of fluid overload (respiratory changes, edema, weight gain, increased abdominal girth)  8.  Monitor of signs for inadequate fluid volume (poor skin turgor, dry mucous membranes)  9.  Instruct patient on adherence to fluid restrictions  Outcome: Progressing     Problem: Urinary Elimination  Goal: Establish and maintain regular urinary output  Description: Target End Date:  Prior to discharge or change in level of  care    1.  Evaluate need to continue indwelling catheter every shift  2.  Assess signs and symptoms of urinary retention  3.  Assess post-void residual volumes  4.  Implement bladder training program  5.  Encourage scheduled voidings  6.  Assist patient to sit on bedside commode or toilet for voiding  7.  Educate patient and family/caregiver on use and purpose of urine collection devices (document in Patient Education)  Outcome: Progressing

## 2024-11-20 NOTE — CONSULTS
Radiology Consult  Author: SOLIS Salazar Date & Time created: 11/20/2024  8:05 AM   Date of admission  11/19/2024  Note to reader: this note follows the APSO format rather than the historical SOAP format. Assessment and plan located at the top of the note for ease of use.    Chief Complaint  89 y.o. male admitted 11/19/2024 with hematuria, dysuria, urgency, melena  Chief Complaint   Patient presents with    Blood in Urine       HPI  Felipe Chowdary is  a 89-year-old male with a past medical history of hypertension and benign prostatic hyperplasia (BPH), who recently underwent prostate embolization on 10/31/2024 with Dr. Tiwari (IR).  He presented to [facility] last night (11/19/2024) complaining of a 2-3 day history of hematuria, and dark stools.  Since prostate embolization, Felipe has also been experiencing some, dysuria, and urgency [facility] which [facility].  [facility],  he was advised to take either Tylenol or ibuprofen for symptom relief and sent home.  [facility] he has continued to experience hematuria, dysuria, urgency and melena.  Last night (11/19/24)he was admitted to [facility] further evaluation and workup of his urinary symptoms as well as melena.  IR has been consulted possible l left prostate artery embolization .      reviewed today's labs: WBC 7.4; H&H 10.5/31.5 down from 13.7/41.7 (10/31/24), Cr 1.37; Coags INR 1.07,  Micro culture pending however UA + for yeast  I reviewed today's imaging- diffuse scattered hazy pulmonary parenchymal opacities noted on am CXR.      Assessment/Plan     Principal Problem:    Hematuria  Active Problems:    Hypertensive urgency    Upper GI bleed    Acute hypoxic respiratory failure (HCC)    BPH (benign prostatic hyperplasia)    Hyponatremia      Plan IR  Patient underwent a successful embolization of the right prostate artery on 10/31/2024 with Dr. Tiwari.  The left prostate arter however was not embolized due to  torturous anatomy and severe atherosclerotic changes.  Since patient is experiencing hematuria, without  relief of symptoms, post right prostate artery embolization; IR recommends ipsilateral left common femoral access for the left prostate artery embolization.  This plan has been discussed today with Dr. Tiwari, Urology  pt and pt family. The plan will be to schedule left prostate enolization for this Friday 11/22/24.  Pt to be NPO at midnight on Thursday 11/21.       Thank you for allowing Interventional Radiology team to participate in the patients care, if any additonal care or requests are needed in the future please do not hesitate call or place IR order           Review of Systems  Physical Exam   Review of Systems   Constitutional:  Positive for malaise/fatigue. Negative for chills and fever.   Gastrointestinal:  Positive for abdominal pain.   Genitourinary:  Positive for dysuria, frequency, hematuria and urgency.   Musculoskeletal:  Positive for myalgias.   Neurological:  Positive for weakness. Negative for dizziness and headaches.      Vitals:    11/20/24 0749   BP: (!) 163/77   Pulse: 82   Resp: 18   Temp: 37.2 °C (98.9 °F)   SpO2: 93%      Physical Exam  Vitals and nursing note reviewed.   HENT:      Head: Normocephalic and atraumatic.      Mouth/Throat:      Mouth: Mucous membranes are dry.      Pharynx: Oropharynx is clear.   Eyes:      Pupils: Pupils are equal, round, and reactive to light.   Cardiovascular:      Rate and Rhythm: Normal rate and regular rhythm.   Pulmonary:      Effort: Pulmonary effort is normal. No respiratory distress.   Abdominal:      Palpations: Abdomen is soft.   Genitourinary:     Comments: 3 way cath with CBI - urine dark pink with scant clots  Musculoskeletal:         General: Normal range of motion.   Skin:     General: Skin is warm and dry.   Neurological:      Mental Status: He is alert and oriented to person, place, and time. Mental status is at baseline.   Psychiatric:     "     Attention and Perception: Attention normal.         Mood and Affect: Mood normal.         Speech: Speech normal.         Behavior: Behavior normal. Behavior is cooperative.             Labs reviewed by me today    Recent Labs     11/19/24 2124 11/20/24 0113 11/20/24  0505   WBC 8.1 7.4  --    RBC 3.35* 3.67*  --    HEMOGLOBIN 10.3* 11.0* 10.5*   HEMATOCRIT 30.1* 32.8* 31.5*   MCV 89.9 89.4  --    MCH 30.7 30.0  --    MCHC 34.2 33.5  --    RDW 44.0 44.5  --    PLATELETCT 361 373  --    MPV 8.7* 8.5*  --      Recent Labs     11/19/24 2124 11/20/24 0113   SODIUM 127* 132*   POTASSIUM 4.9 4.8   CHLORIDE 94* 96   CO2 22 24   GLUCOSE 104* 101*   BUN 23* 20   CREATININE 1.35 1.37   CALCIUM 8.5 9.2     DX-CHEST-PORTABLE (1 VIEW)   Final Result         1.  Pulmonary edema and/or infiltrates.   2.  Cardiomegaly   3.  Atherosclerosis      IR-CONSULT AND TREAT    (Results Pending)     Recent Labs     11/19/24 2124 11/20/24 0113   SODIUM 127* 132*   POTASSIUM 4.9 4.8   CHLORIDE 94* 96   CO2 22 24   GLUCOSE 104* 101*   BUN 23* 20     INR   Date Value Ref Range Status   11/19/2024 1.07 0.87 - 1.13 Final     Comment:     INR - Non-therapeutic Reference Range: 0.87-1.13  INR - Therapeutic Reference Range: 2.0-4.0       No results found for: \"POCINR\"   No intake or output data in the 24 hours ending 11/20/24 0805   Labs not explicitly included in this progress note were reviewed by the author. Radiology/imaging not explicitly included in this progress note was reviewed by the author.     History reviewed. No pertinent past medical history.     Home Medications    Medication Sig Taking? Last Dose Authorizing Provider   phenazopyridine (PYRIDIUM) 200 MG Tab Take 200 mg by mouth 3 times a day as needed.  Unknown Physician Outpatient   HYDROcodone-acetaminophen (NORCO) 5-325 MG Tab per tablet Take 1 Tablet by mouth every 8 hours as needed. Indications: Pain  Unknown Physician Outpatient   docusate calcium (SURFAK) 240 MG Cap " Take 240 mg by mouth every day.  Unknown Physician Outpatient   amLODIPine (NORVASC) 5 MG Tab Take 5 mg by mouth every evening.  Unknown Physician Outpatient   ciprofloxacin (CIPRO) 750 MG Tab Take 1 Tablet by mouth 2 times a day.  Unknown LUIS CARLOS Orozco   finasteride (PROSCAR) 5 MG Tab Take 5 mg by mouth every day.  Unknown Physician Outpatient   losartan (COZAAR) 25 MG Tab Take 25 mg by mouth every day.  Unknown Physician Outpatient   terazosin (HYTRIN) 2 MG Cap Take 4 mg by mouth every evening. 2 mg x 2 capsules = 4 mg  Unknown Physician Outpatient   pantoprazole (PROTONIX) 40 MG Tablet Delayed Response Take 40 mg by mouth every day.  Unknown Physician Outpatient   aspirin 81 MG EC tablet Take 81 mg by mouth every day.  Unknown Physician Outpatient       I have performed a physical exam and reviewed and updated ROS and Plan today (11/20/2024).     55  minutes in directly providing and coordinating care and extensive data review.  No time overlap and excludes procedures.

## 2024-11-20 NOTE — PROGRESS NOTES
Pt arrived to unit via gurney at 0000. Pt oriented to room, unit, and plan of care. Tele-monitor placed and monitor room notified. CBI in place and irrigating, urine is light pink with few clots noted. Pt denies pain at this time, just discomfort from medrano placement. All questions answered at this time. Call light within reach; fall precautions in place.

## 2024-11-20 NOTE — ED NOTES
Med rec updated and complete.  Allergies reviewed.     [facility]    Pt is unable to clarify  last doses taken.    No anticoagulants noted of profile.    Ciprofloxacin 750 mg was written  11/04/24 or a 5 day course. Unsure If patient finished the course.    Pt had a [facility]  D[facility] mg [facility]. Last dispense in April 2024. Medication not added to med rec.    Family at bedside unsure when the pt last took his medications.    Preferred Pharmacy  [phone]

## 2024-11-20 NOTE — ED NOTES
Assist RN: 3-way Fr 24 medrano inserted for irrigation. Tolerated well. Big blood clot was passed after insertion. Bloody urine noted. CBI started. Urine incontinence care done. Bed sheet, gown and blanket  changed.

## 2024-11-20 NOTE — ASSESSMENT & PLAN NOTE
Patient with SBP into to the 210s.  States that they have not taken their antihypertensives at home for several days now.  - I have resumed home blood pressure medications, remains elevated but improving, ctm   -Hydralazine as needed

## 2024-11-20 NOTE — ASSESSMENT & PLAN NOTE
-Continue home finasteride  - s/p left PAE with IR 11/22  Urology following    Surgeon: Dr. Nael Syed    Requesting Physician: Dr. Anel Sanders    HISTORY OF PRESENT ILLNESS:  This is a 79year old male with a past medical history of CLL dx 16yrs ago, obstructive uropathy,  right hip fracture (5/2017), pneumonia, h/o PE on eliquis, diastolic CHF, BIBA to [facility] ED after attempting to get out of bed to look for his glasses and falling hitting right side of face.  Patient reports he's had multiple falls despite using a walker.  Denies LOC, dizziness, chest pain, palpitation, sob or any h/o syncope.  Patient was unable to give time he was lying on the floor of his home. Thoracic service was consulted to evaluate patient after the CT Thoracic spine showed multiple right rib fractures 7-10, mild to moderate right hemothorax.    PAST MEDICAL & SURGICAL HISTORY:  Hip fracture requiring operative repair, right, sequela  PE (pulmonary thromboembolism)  CLL (chronic lymphocytic leukemia)  S/P hip hemiarthroplasty      MEDICATIONS  (STANDING):  sodium chloride 0.9%. 1000 milliLiter(s) (100 mL/Hr) IV Continuous <Continuous>    MEDICATIONS  (PRN):      Allergies  No Known Allergies    Intolerances    SOCIAL HISTORY:  Smoker:  YES / NO        PACK YEARS:                         WHEN QUIT?  ETOH use:  YES / NO               FREQUENCY / QUANTITY:  Ilic[employer] Drug use:  YES / NO  Occupation:  Assisted device use (Walker):  Live with:alone    FAMILY HISTORY:  No pertinent family history in first degree relatives    Review of Systems  CONSTITUTIONAL:  Fevers / chills, sweats, fatigue, weight loss, weight gain                                    NEGATIVE  NEURO:  parathesias, seizures, syncope, confusion                                                                               NEGATIVE  EYES:  Blurry vision, discharge, pain, loss of vision                                                                                  NEGATIVE  ENMT:  Difficulty hearing, vertigo, dysphagia, epistaxis, recent dental work                                     NEGATIVE  CV:  Chest pain, palpitations, SANDOVAL, orthopnea                                                                                         NEGATIVE  RESPIRATORY:  Wheezing, SOB, cough / sputum, hemoptysis                                                              NEGATIVE  GI:  Nausea, vomiting, diarrhea, constipation, melena                                                                        NEGATIVE  : Hematuria, dysuria, urgency, incontinence                 NEGATIVE     /       Urinary retention  POSITIVE  MUSKULOSKELETAL:  arthritis, joint swelling, muscle weakness     NEGATIVE     / RLE weakness(after hip surgery)    POSITIVE  SKIN/BREAST:  rash, itching, hair loss, masses                                                                                            NEGATIVE  PSYCH:  depression, anxiety, suicidal ideation                                                                                             NEGATIVE  HEME/LYMPH:  bruises easily, enlarged lymph nodes, tender lymph nodes                                        NEGATIVE  ENDOCRINE:  cold intolerance, heat intolerance, polydipsia                                                                   NEGATIVE    PHYSICAL EXAM  Vital Signs Last 24 Hrs  T(C): 36.8 (04 Feb 2018 20:18), Max: 36.9 (04 Feb 2018 19:04)  T(F): 98.3 (04 Feb 2018 20:18), Max: 98.4 (04 Feb 2018 19:04)  HR: 110 (04 Feb 2018 23:27) (109 - 112)  BP: 160/72 (04 Feb 2018 23:27) (154/83 - 164/96)  BP(mean): --  RR: 17 (04 Feb 2018 23:27) (17 - 18)  SpO2: 96% (04 Feb 2018 23:27) (96% - 97%)    Physical Exam  CONSTITUTIONAL: Seen bedside on room air in no apparent distress                                                     NEURO:  Alert and oriented x3, no gross deficits appreciated                   EYES:      PERRL , Abrasion under right eye medial bridge of nose           ENMT:  supple neck, no lymphadenopathy  CV:     RRR, S1S2, no murmur  RESPIRATORY:  equal breath sounds, no rales, no rhonchi, no wheeze, Right flank ecchymosis appearing to be resolving,  non-tender on palpation  GI:  soft, nontender, positive bowel sounds  : GÓMEZ + / -    MUSKULOSKELETAL:  moves all extremities  SKIN / BREAST:    bilateral LE with generalized   EXTREMITIES:  no edema  VASCULAR:    all pulses intact                    LABS:                        10.9   22.8  )-----------( 169      ( 04 Feb 2018 18:14 )             33.8     02-04    141  |  94<L>  |  41<H>  ----------------------------<  135<H>  4.2   |  33<H>  |  1.86<H>    Ca    9.6      04 Feb 2018 18:14  Mg     2.2     02-04    TPro  7.4  /  Alb  4.2  /  TBili  0.6  /  DBili  x   /  AST  31  /  ALT  24  /  AlkPhos  69  02-04    PT/INR - ( 04 Feb 2018 18:14 )   PT: 18.9 sec;   INR: 1.68          PTT - ( 04 Feb 2018 18:14 )  PTT:28.4 sec    CARDIAC MARKERS ( 04 Feb 2018 22:26 )  x     / 0.04 ng/mL / x     / x     / x      CARDIAC MARKERS ( 04 Feb 2018 18:14 )  x     / 0.05 ng/mL / 889 U/L / x     / 8.7 ng/mL    RADIOLOGY & ADDITIONAL STUDIES:  CXR: (2/5/18) small right apical ptx, small right effusion (official read pending)    CT scan:<   from: CT Chest No Cont (02.04.18 @ 23:19) >  CT of the CHEST, ABDOMEN, and PELVIS without intravenous contrast dated   2/4/2018 11:18 PM    CLINICAL STATEMENT: Status post fall. Multiple rib fractures.    TECHNIQUE: CT of the chest, abdomen, and pelvis without intravenous or   oral contrast. Axial, sagittal, and coronal images were obtained and   reviewed.    COMPARISON: CT thoracic spine 2/4/2018 and CT chest abdomen and pelvis   6/5/2017.    FINDINGS:    ---CHEST---    Lower Neck: Visualized thyroid unremarkable. No lower cervical   lymphadenopathy.    Lungs/Pleura/Airways: Small to moderate right hypodense pleural effusion,   unchanged in size from earlier the same day, with overlying passive   atelectasis. Trace right apical pneumothorax, stable since earlier the   the same day. No pneumatocele. Right lower lobe calcified granuloma.   Lingular micronodule. Patent central airways.    Mediastinum: Heart is normal in size. No pericardial effusion. No aortic   < from: CT Chest No Cont (02.04.18 @ 23:19) >  aneurysm. Mild calcifications of the coronary arteries, aortic valve, and   aortic arch.    Lymph nodes: No lymphadenopathy.    Bones/Soft tissues: Significantly displaced fracture of the lateral right   7th rib. The right 8th, 9th, and 10th ribs are fractured in two   locations, posterior and lateral, and demonstrate varying degrees of   displacement except for the right posterior 10th rib fracture which is  nondisplaced. Mild hemorrhagic edema around several of the rib fractures.   Few small foci of air in the right lateral chest wall related to recent   trauma. Chronic deformities of multiple right anterior ribs. New   bilateral gynecomastia.      ---ABDOMEN/PELVIS---    Evaluation of the abdominopelvic viscera is limited due to noncontrast   technique.    Liver: Smooth in contour. No definite focal lesion.     < from: CT Chest No Cont (02.04.18 @ 23:19) >  Biliary system: Gallbladder is normal in size. No calcified gallstones.   No biliary ductal dilatation.    Pancreas: Unremarkable.    Spleen: Unremarkable.    Adrenal glands: Stable diffuse adrenal thickening.    Kidneys: Mild bilateral hydronephrosis and hydroureter, likely secondary   to a increased hydrostatic pressure due to markedly distended bladder. No   renal or ureteral calculus.   Urinary Bladder: Markedly distended. The bladder wall is trabeculated,   which may be related to chronic bladder outlet obstruction.     Reproductive organs: Obscured by streak artifact from right hip hardware.   Enlarged prostate, unchanged.     Bowel/Peritoneum: Limited evaluation without contrast. Normal caliber   without evidence of obstruction. Abundant colonic feces.  No appreciable   wall thickening. Normal appendix. Small hiatal hernia. No ascites or   extraluminal gas.     Lymph nodes: No lymphadenopathy. Partially calcified portacaval lymph   nodes, unchanged.    Aorta/IVC: Normal caliber. Heavily calcified plaque aorta and its pelvic   branches.    Abdominal wall: Small to moderate bilateral fat-containing inguinal   hernias, increased. Tiny fat-containing umbilical hernia, unchanged.    Bones/Soft tissues: Mild acute compression fracture deformity of L1 with   osseous retropulsion resulting in mild canal stenosis. Mild soft tissue   swelling over the mid low back and along the right posterolateral   abdominal wall. Status post right total hip arthroplasty.     < from: CT Chest No Cont (02.04.18 @ 23:19) >  IMPRESSION:     1.  Fractures of the right 7th through 10th ribs, as detailed above.   Right 8th, 9th, and 10th ribs are fractured in two places.  2.  Acute mild compression fracture of L1.  3.  Trace right apical pneumothorax.  4.  Small to moderate right pleural effusion.  5.  Mild bilateral hydroureteronephrosis, likely secondary to a markedly   distended urinary bladder.      < end of copied text >    < from: CT Head No Cont (02.04.18 @ 19:46) >    INTERPRETATION:  PROCEDURE: CT brain without intravenous contrast    INDICATIONS: Status post fall on Eliquis.    < end of copied text >  < from: CT Head No Cont (02.04.18 @ 19:46) >    IMPRESSION:  No acute intracranial hemorrhage or calvarial fracture.    < end of copied text >      < from: CT Thoracic Spine No Cont (02.04.18 @ 19:46) >    EXAM:  CT THORACIC SPINE                          PROCEDURE DATE:  02/04/2018           INTERPRETATION:    PROCEDURE: CT thoracic spine without contrast    INDICATION: Fall. Lower thoracic pain.  < from: CT Thoracic Spine No Cont (02.04.18 @ 19:46) >    COMPARISON: Chest CTA 5/24/2017. Abdomen/pelvis CT 6/5/2017.    FINDINGS: The CT exam demonstrates an acute mild compression fracture   through the L1 vertebral body with mild osseous retropulsion resulting in   mild central canal narrowing. The fracture extends through the anterior   and posterior cortices but does not appear to involve the posterior   elements. Thoracic alignment is intact. The intervertebral disc spaces   are preserved. Diffuse osteopenia is demonstrated.    Multiple acute right rib fractures are noted. The right lateral 7th rib   fracture is displaced byapproximately one shaft width and overriding   (only seen on coronal images). The right posterior 8th and 9th rib   fractures are comminuted and moderately displaced by approximately one   half shaft width. The right 10th rib demonstrates two fractures; the   posterior fracture is nondisplaced and the posterolateral fracture (only   seen on coronal images) is mildly displaced. There are small hematomas   surrounding several of these rib fractures. A small to moderate right   pleural effusion withassociated passive atelectasis is identified. The   effusion is mildly hyperdense (25 HU) suggesting hemorrhagic components.   There is also a trace right apical pneumothorax.    Fullness of the bilateral renal collecting systems are partially imaged  and was also present to some degree on the 6/5/2017 CT.    IMPRESSION:  1.  Mild acute compression fracture of L1 with osseous retropulsion   resulting in mild canal stenosis.  2.  Fractures of right 7th through 10th ribs as described above.   3.  Trace right apical pneumothorax. Mild to moderate right hemothorax.    < end of copied text >        EKG:    TTE / JAMEY:    Cardiac Cath: Surgeon: Dr. Nael Syed    Requesting Physician: Dr. Anel Sanders    HISTORY OF PRESENT ILLNESS:  This is a 79year old male with a past medical history of CLL dx 16yrs ago, obstructive uropathy,  right hip fracture (5/2017), pneumonia, h/o PE on eliquis, diastolic CHF, BIBA to [facility] ED after attempting to get out of bed to look for his glasses and falling hitting right side of face.  Patient reports he's had multiple falls despite using a walker.  Denies LOC, dizziness, chest pain, palpitation, sob or any h/o syncope.  Patient was unable to give time he was lying on the floor of his home. Thoracic service was consulted to evaluate patient after the CT Thoracic spine showed multiple right rib fractures 7-10, trace right apical pneumothorax, mild to moderate right hemothorax.    PAST MEDICAL & SURGICAL HISTORY:  Hip fracture requiring operative repair, right, sequela  PE (pulmonary thromboembolism)  CLL (chronic lymphocytic leukemia)  S/P hip hemiarthroplasty      MEDICATIONS  (STANDING):  sodium chloride 0.9%. 1000 milliLiter(s) (100 mL/Hr) IV Continuous <Continuous>    MEDICATIONS  (PRN):      Allergies  No Known Allergies    Intolerances    Social History (marital status, living situation, occupation, tobacco use, alcohol and drug use, and sexual history): Lives at home, has HHA. Denies smoking, alcohol or illicit drug use. Ambulates with a rolling walker.    FAMILY HISTORY:  No pertinent family history in first degree relatives    Review of Systems  CONSTITUTIONAL:  Fevers / chills, sweats, fatigue, weight loss, weight gain                                    NEGATIVE  NEURO:  parathesias, seizures, syncope, confusion                                                                               NEGATIVE  EYES:  Blurry vision, discharge, pain, loss of vision                                                                                  NEGATIVE  ENMT:  Difficulty hearing, vertigo, dysphagia, epistaxis, recent dental work                                     NEGATIVE  CV:  Chest pain, palpitations, SANDOVAL, orthopnea                                                                                         NEGATIVE  RESPIRATORY:  Wheezing, SOB, cough / sputum, hemoptysis                                                              NEGATIVE  GI:  Nausea, vomiting, diarrhea, constipation, melena                                                                        NEGATIVE  : Hematuria, dysuria, urgency, incontinence                 NEGATIVE     /       Urinary retention  POSITIVE  MUSKULOSKELETAL:  arthritis, joint swelling, muscle weakness     NEGATIVE     / RLE weakness(after hip surgery)    POSITIVE  SKIN/BREAST:  rash, itching, hair loss, masses                                                                                            NEGATIVE  PSYCH:  depression, anxiety, suicidal ideation                                                                                             NEGATIVE  HEME/LYMPH:  bruises easily, enlarged lymph nodes, tender lymph nodes                                        NEGATIVE  ENDOCRINE:  cold intolerance, heat intolerance, polydipsia                                                                   NEGATIVE    PHYSICAL EXAM  Vital Signs Last 24 Hrs  T(C): 36.8 (04 Feb 2018 20:18), Max: 36.9 (04 Feb 2018 19:04)  T(F): 98.3 (04 Feb 2018 20:18), Max: 98.4 (04 Feb 2018 19:04)  HR: 110 (04 Feb 2018 23:27) (109 - 112)  BP: 160/72 (04 Feb 2018 23:27) (154/83 - 164/96)  BP(mean): --  RR: 17 (04 Feb 2018 23:27) (17 - 18)  SpO2: 96% (04 Feb 2018 23:27) (96% - 97%)    Physical Exam  CONSTITUTIONAL: Seen bedside on room air in no apparent distress                                                     NEURO:  Alert and oriented x3, no gross deficits appreciated                   EYES:      PERRL , Abrasion under right eye medial bridge of nose           ENMT:  supple neck, no lymphadenopathy  CV:     RRR, S1S2, no murmur  RESPIRATORY:  equal breath sounds, no rales, no rhonchi, no wheeze, Right flank ecchymosis appearing to be resolving,  non-tender on palpation  GI:  soft, nontender, positive bowel sounds  : GÓMEZ + / -    MUSKULOSKELETAL:  moves all extremities  SKIN / BREAST:    bilateral LE with generalized   EXTREMITIES:  no edema  VASCULAR:    all pulses intact                    LABS:                        10.9   22.8  )-----------( 169      ( 04 Feb 2018 18:14 )             33.8     02-04    141  |  94<L>  |  41<H>  ----------------------------<  135<H>  4.2   |  33<H>  |  1.86<H>    Ca    9.6      04 Feb 2018 18:14  Mg     2.2     02-04    TPro  7.4  /  Alb  4.2  /  TBili  0.6  /  DBili  x   /  AST  31  /  ALT  24  /  AlkPhos  69  02-04    PT/INR - ( 04 Feb 2018 18:14 )   PT: 18.9 sec;   INR: 1.68          PTT - ( 04 Feb 2018 18:14 )  PTT:28.4 sec    CARDIAC MARKERS ( 04 Feb 2018 22:26 )  x     / 0.04 ng/mL / x     / x     / x      CARDIAC MARKERS ( 04 Feb 2018 18:14 )  x     / 0.05 ng/mL / 889 U/L / x     / 8.7 ng/mL    RADIOLOGY & ADDITIONAL STUDIES:  CXR: (2/5/18) small right apical ptx, small right effusion (official read pending)    CT scan:<   from: CT Chest No Cont (02.04.18 @ 23:19) >  CT of the CHEST, ABDOMEN, and PELVIS without intravenous contrast dated   2/4/2018 11:18 PM    CLINICAL STATEMENT: Status post fall. Multiple rib fractures.    TECHNIQUE: CT of the chest, abdomen, and pelvis without intravenous or   oral contrast. Axial, sagittal, and coronal images were obtained and   reviewed.    COMPARISON: CT thoracic spine 2/4/2018 and CT chest abdomen and pelvis   6/5/2017.    FINDINGS:    ---CHEST---    Lower Neck: Visualized thyroid unremarkable. No lower cervical   lymphadenopathy.    Lungs/Pleura/Airways: Small to moderate right hypodense pleural effusion,   unchanged in size from earlier the same day, with overlying passive   atelectasis. Trace right apical pneumothorax, stable since earlier the   the same day. No pneumatocele. Right lower lobe calcified granuloma.   Lingular micronodule. Patent central airways.    Mediastinum: Heart is normal in size. No pericardial effusion. No aortic   < from: CT Chest No Cont (02.04.18 @ 23:19) >  aneurysm. Mild calcifications of the coronary arteries, aortic valve, and   aortic arch.    Lymph nodes: No lymphadenopathy.    Bones/Soft tissues: Significantly displaced fracture of the lateral right   7th rib. The right 8th, 9th, and 10th ribs are fractured in two   locations, posterior and lateral, and demonstrate varying degrees of   displacement except for the right posterior 10th rib fracture which is  nondisplaced. Mild hemorrhagic edema around several of the rib fractures.   Few small foci of air in the right lateral chest wall related to recent   trauma. Chronic deformities of multiple right anterior ribs. New   bilateral gynecomastia.      ---ABDOMEN/PELVIS---    Evaluation of the abdominopelvic viscera is limited due to noncontrast   technique.    Liver: Smooth in contour. No definite focal lesion.     < from: CT Chest No Cont (02.04.18 @ 23:19) >  Biliary system: Gallbladder is normal in size. No calcified gallstones.   No biliary ductal dilatation.    Pancreas: Unremarkable.    Spleen: Unremarkable.    Adrenal glands: Stable diffuse adrenal thickening.    Kidneys: Mild bilateral hydronephrosis and hydroureter, likely secondary   to a increased hydrostatic pressure due to markedly distended bladder. No   renal or ureteral calculus.   Urinary Bladder: Markedly distended. The bladder wall is trabeculated,   which may be related to chronic bladder outlet obstruction.     Reproductive organs: Obscured by streak artifact from right hip hardware.   Enlarged prostate, unchanged.     Bowel/Peritoneum: Limited evaluation without contrast. Normal caliber   without evidence of obstruction. Abundant colonic feces.  No appreciable   wall thickening. Normal appendix. Small hiatal hernia. No ascites or   extraluminal gas.     Lymph nodes: No lymphadenopathy. Partially calcified portacaval lymph   nodes, unchanged.    Aorta/IVC: Normal caliber. Heavily calcified plaque aorta and its pelvic   branches.    Abdominal wall: Small to moderate bilateral fat-containing inguinal   hernias, increased. Tiny fat-containing umbilical hernia, unchanged.    Bones/Soft tissues: Mild acute compression fracture deformity of L1 with   osseous retropulsion resulting in mild canal stenosis. Mild soft tissue   swelling over the mid low back and along the right posterolateral   abdominal wall. Status post right total hip arthroplasty.     < from: CT Chest No Cont (02.04.18 @ 23:19) >  IMPRESSION:     1.  Fractures of the right 7th through 10th ribs, as detailed above.   Right 8th, 9th, and 10th ribs are fractured in two places.  2.  Acute mild compression fracture of L1.  3.  Trace right apical pneumothorax.  4.  Small to moderate right pleural effusion.  5.  Mild bilateral hydroureteronephrosis, likely secondary to a markedly   distended urinary bladder.      < end of copied text >    < from: CT Head No Cont (02.04.18 @ 19:46) >    INTERPRETATION:  PROCEDURE: CT brain without intravenous contrast    INDICATIONS: Status post fall on Eliquis.    < end of copied text >  < from: CT Head No Cont (02.04.18 @ 19:46) >    IMPRESSION:  No acute intracranial hemorrhage or calvarial fracture.    < end of copied text >      < from: CT Thoracic Spine No Cont (02.04.18 @ 19:46) >    EXAM:  CT THORACIC SPINE                          PROCEDURE DATE:  02/04/2018           INTERPRETATION:    PROCEDURE: CT thoracic spine without contrast    INDICATION: Fall. Lower thoracic pain.  < from: CT Thoracic Spine No Cont (02.04.18 @ 19:46) >    COMPARISON: Chest CTA 5/24/2017. Abdomen/pelvis CT 6/5/2017.    FINDINGS: The CT exam demonstrates an acute mild compression fracture   through the L1 vertebral body with mild osseous retropulsion resulting in   mild central canal narrowing. The fracture extends through the anterior   and posterior cortices but does not appear to involve the posterior   elements. Thoracic alignment is intact. The intervertebral disc spaces   are preserved. Diffuse osteopenia is demonstrated.    Multiple acute right rib fractures are noted. The right lateral 7th rib   fracture is displaced byapproximately one shaft width and overriding   (only seen on coronal images). The right posterior 8th and 9th rib   fractures are comminuted and moderately displaced by approximately one   half shaft width. The right 10th rib demonstrates two fractures; the   posterior fracture is nondisplaced and the posterolateral fracture (only   seen on coronal images) is mildly displaced. There are small hematomas   surrounding several of these rib fractures. A small to moderate right   pleural effusion withassociated passive atelectasis is identified. The   effusion is mildly hyperdense (25 HU) suggesting hemorrhagic components.   There is also a trace right apical pneumothorax.    Fullness of the bilateral renal collecting systems are partially imaged  and was also present to some degree on the 6/5/2017 CT.    IMPRESSION:  1.  Mild acute compression fracture of L1 with osseous retropulsion   resulting in mild canal stenosis.  2.  Fractures of right 7th through 10th ribs as described above.   3.  Trace right apical pneumothorax. Mild to moderate right hemothorax.    < end of copied text > Surgeon: Dr. Nael Syed    Requesting Physician: Dr. Anel Sanders    HISTORY OF PRESENT ILLNESS:  This is a 79year old male with a past medical history of CLL dx 16yrs ago, obstructive uropathy,  right hip fracture (5/2017), pneumonia, h/o PE on eliquis, diastolic CHF, BIBA to [facility] ED after attempting to get out of bed to look for his glasses and falling hitting right side of face.  Patient reports he's had multiple falls despite using a walker.  Denies LOC, dizziness, chest pain, palpitation, sob or any h/o syncope.  Patient was unable to give time he was lying on the floor of his home. Thoracic service was consulted to evaluate patient after the CT Thoracic spine showed multiple right rib fractures 7-10, trace right apical pneumothorax, mild to moderate right hemothorax.    PAST MEDICAL & SURGICAL HISTORY:  Hip fracture requiring operative repair, right, sequela  PE (pulmonary thromboembolism)  CLL (chronic lymphocytic leukemia)  S/P hip hemiarthroplasty      MEDICATIONS  (STANDING):  sodium chloride 0.9%. 1000 milliLiter(s) (100 mL/Hr) IV Continuous <Continuous>    MEDICATIONS  (PRN):      Allergies  No Known Allergies    Intolerances    Social History (marital status, living situation, occupation, tobacco use, alcohol and drug use, and sexual history): Lives at home, has HHA. Denies smoking, alcohol or illicit drug use. Ambulates with a rolling walker.    FAMILY HISTORY:  No pertinent family history in first degree relatives    Review of Systems  CONSTITUTIONAL:  Fevers / chills, sweats, fatigue, weight loss, weight gain                                    NEGATIVE  NEURO:  parathesias, seizures, syncope, confusion                                                                               NEGATIVE  EYES:  Blurry vision, discharge, pain, loss of vision                                                                                  NEGATIVE  ENMT:  Difficulty hearing, vertigo, dysphagia, epistaxis, recent dental work                                     NEGATIVE  CV:  Chest pain, palpitations, SANDOVAL, orthopnea                                                                                         NEGATIVE  RESPIRATORY:  Wheezing, SOB, cough / sputum, hemoptysis                                                              NEGATIVE  GI:  Nausea, vomiting, diarrhea, constipation, melena                                                                        NEGATIVE  : Hematuria, dysuria, urgency, incontinence                 NEGATIVE     /       Urinary retention  POSITIVE  MUSKULOSKELETAL:  arthritis, joint swelling, muscle weakness     NEGATIVE     / RLE weakness(after hip surgery)    POSITIVE  SKIN/BREAST:  rash, itching, hair loss, masses                                                                                            NEGATIVE  PSYCH:  depression, anxiety, suicidal ideation                                                                                             NEGATIVE  HEME/LYMPH:  bruises easily, enlarged lymph nodes, tender lymph nodes                                        NEGATIVE  ENDOCRINE:  cold intolerance, heat intolerance, polydipsia                                                                   NEGATIVE    PHYSICAL EXAM  Vital Signs Last 24 Hrs  T(C): 36.8 (04 Feb 2018 20:18), Max: 36.9 (04 Feb 2018 19:04)  T(F): 98.3 (04 Feb 2018 20:18), Max: 98.4 (04 Feb 2018 19:04)  HR: 110 (04 Feb 2018 23:27) (109 - 112)  BP: 160/72 (04 Feb 2018 23:27) (154/83 - 164/96)  BP(mean): --  RR: 17 (04 Feb 2018 23:27) (17 - 18)  SpO2: 96% (04 Feb 2018 23:27) (96% - 97%)    Physical Exam  CONSTITUTIONAL: Seen bedside on room air in no apparent distress                                                     NEURO:  Alert and oriented x3, no gross deficits appreciated                   EYES:      PERRL , Abrasion under right eye medial bridge of nose           ENMT:  supple neck, no lymphadenopathy  CV:     RRR, S1S2, no murmur  RESPIRATORY:  equal breath sounds, no rales, no rhonchi, no wheeze, Right flank ecchymosis appearing to be resolving,  non-tender on palpation  GI:  soft, nontender, positive bowel sounds  : GÓMEZ + / -    MUSKULOSKELETAL:  moves all extremities  SKIN / BREAST:    bilateral LE with generalized light colored plaques, no open wound  EXTREMITIES:  2+ pitting edema b/l LE  VASCULAR:    all pulses intact                    LABS:                        10.9   22.8  )-----------( 169      ( 04 Feb 2018 18:14 )             33.8     02-04    141  |  94<L>  |  41<H>  ----------------------------<  135<H>  4.2   |  33<H>  |  1.86<H>    Ca    9.6      04 Feb 2018 18:14  Mg     2.2     02-04    TPro  7.4  /  Alb  4.2  /  TBili  0.6  /  DBili  x   /  AST  31  /  ALT  24  /  AlkPhos  69  02-04    PT/INR - ( 04 Feb 2018 18:14 )   PT: 18.9 sec;   INR: 1.68          PTT - ( 04 Feb 2018 18:14 )  PTT:28.4 sec    CARDIAC MARKERS ( 04 Feb 2018 22:26 )  x     / 0.04 ng/mL / x     / x     / x      CARDIAC MARKERS ( 04 Feb 2018 18:14 )  x     / 0.05 ng/mL / 889 U/L / x     / 8.7 ng/mL    RADIOLOGY & ADDITIONAL STUDIES:  CXR: (2/5/18) small right apical ptx, small right effusion (official read pending)    CT scan:<   from: CT Chest No Cont (02.04.18 @ 23:19) >  CT of the CHEST, ABDOMEN, and PELVIS without intravenous contrast dated   2/4/2018 11:18 PM    CLINICAL STATEMENT: Status post fall. Multiple rib fractures.    TECHNIQUE: CT of the chest, abdomen, and pelvis without intravenous or   oral contrast. Axial, sagittal, and coronal images were obtained and   reviewed.    COMPARISON: CT thoracic spine 2/4/2018 and CT chest abdomen and pelvis   6/5/2017.    FINDINGS:    ---CHEST---    Lower Neck: Visualized thyroid unremarkable. No lower cervical   lymphadenopathy.    Lungs/Pleura/Airways: Small to moderate right hypodense pleural effusion,   unchanged in size from earlier the same day, with overlying passive   atelectasis. Trace right apical pneumothorax, stable since earlier the   the same day. No pneumatocele. Right lower lobe calcified granuloma.   Lingular micronodule. Patent central airways.    Mediastinum: Heart is normal in size. No pericardial effusion. No aortic   < from: CT Chest No Cont (02.04.18 @ 23:19) >  aneurysm. Mild calcifications of the coronary arteries, aortic valve, and   aortic arch.    Lymph nodes: No lymphadenopathy.    Bones/Soft tissues: Significantly displaced fracture of the lateral right   7th rib. The right 8th, 9th, and 10th ribs are fractured in two   locations, posterior and lateral, and demonstrate varying degrees of   displacement except for the right posterior 10th rib fracture which is  nondisplaced. Mild hemorrhagic edema around several of the rib fractures.   Few small foci of air in the right lateral chest wall related to recent   trauma. Chronic deformities of multiple right anterior ribs. New   bilateral gynecomastia.      ---ABDOMEN/PELVIS---    Evaluation of the abdominopelvic viscera is limited due to noncontrast   technique.    Liver: Smooth in contour. No definite focal lesion.     < from: CT Chest No Cont (02.04.18 @ 23:19) >  Biliary system: Gallbladder is normal in size. No calcified gallstones.   No biliary ductal dilatation.    Pancreas: Unremarkable.    Spleen: Unremarkable.    Adrenal glands: Stable diffuse adrenal thickening.    Kidneys: Mild bilateral hydronephrosis and hydroureter, likely secondary   to a increased hydrostatic pressure due to markedly distended bladder. No   renal or ureteral calculus.   Urinary Bladder: Markedly distended. The bladder wall is trabeculated,   which may be related to chronic bladder outlet obstruction.     Reproductive organs: Obscured by streak artifact from right hip hardware.   Enlarged prostate, unchanged.     Bowel/Peritoneum: Limited evaluation without contrast. Normal caliber   without evidence of obstruction. Abundant colonic feces.  No appreciable   wall thickening. Normal appendix. Small hiatal hernia. No ascites or   extraluminal gas.     Lymph nodes: No lymphadenopathy. Partially calcified portacaval lymph   nodes, unchanged.    Aorta/IVC: Normal caliber. Heavily calcified plaque aorta and its pelvic   branches.    Abdominal wall: Small to moderate bilateral fat-containing inguinal   hernias, increased. Tiny fat-containing umbilical hernia, unchanged.    Bones/Soft tissues: Mild acute compression fracture deformity of L1 with   osseous retropulsion resulting in mild canal stenosis. Mild soft tissue   swelling over the mid low back and along the right posterolateral   abdominal wall. Status post right total hip arthroplasty.     < from: CT Chest No Cont (02.04.18 @ 23:19) >  IMPRESSION:     1.  Fractures of the right 7th through 10th ribs, as detailed above.   Right 8th, 9th, and 10th ribs are fractured in two places.  2.  Acute mild compression fracture of L1.  3.  Trace right apical pneumothorax.  4.  Small to moderate right pleural effusion.  5.  Mild bilateral hydroureteronephrosis, likely secondary to a markedly   distended urinary bladder.      < end of copied text >    < from: CT Head No Cont (02.04.18 @ 19:46) >    INTERPRETATION:  PROCEDURE: CT brain without intravenous contrast    INDICATIONS: Status post fall on Eliquis.    < end of copied text >  < from: CT Head No Cont (02.04.18 @ 19:46) >    IMPRESSION:  No acute intracranial hemorrhage or calvarial fracture.    < end of copied text >      < from: CT Thoracic Spine No Cont (02.04.18 @ 19:46) >    EXAM:  CT THORACIC SPINE                          PROCEDURE DATE:  02/04/2018           INTERPRETATION:    PROCEDURE: CT thoracic spine without contrast    INDICATION: Fall. Lower thoracic pain.  < from: CT Thoracic Spine No Cont (02.04.18 @ 19:46) >    COMPARISON: Chest CTA 5/24/2017. Abdomen/pelvis CT 6/5/2017.    FINDINGS: The CT exam demonstrates an acute mild compression fracture   through the L1 vertebral body with mild osseous retropulsion resulting in   mild central canal narrowing. The fracture extends through the anterior   and posterior cortices but does not appear to involve the posterior   elements. Thoracic alignment is intact. The intervertebral disc spaces   are preserved. Diffuse osteopenia is demonstrated.    Multiple acute right rib fractures are noted. The right lateral 7th rib   fracture is displaced byapproximately one shaft width and overriding   (only seen on coronal images). The right posterior 8th and 9th rib   fractures are comminuted and moderately displaced by approximately one   half shaft width. The right 10th rib demonstrates two fractures; the   posterior fracture is nondisplaced and the posterolateral fracture (only   seen on coronal images) is mildly displaced. There are small hematomas   surrounding several of these rib fractures. A small to moderate right   pleural effusion withassociated passive atelectasis is identified. The   effusion is mildly hyperdense (25 HU) suggesting hemorrhagic components.   There is also a trace right apical pneumothorax.    Fullness of the bilateral renal collecting systems are partially imaged  and was also present to some degree on the 6/5/2017 CT.    IMPRESSION:  1.  Mild acute compression fracture of L1 with osseous retropulsion   resulting in mild canal stenosis.  2.  Fractures of right 7th through 10th ribs as described above.   3.  Trace right apical pneumothorax. Mild to moderate right hemothorax.    < end of copied text >

## 2024-11-20 NOTE — CARE PLAN
The patient is Watcher - Medium risk of patient condition declining or worsening    Shift Goals  Clinical Goals: tele monitoring, CBI, monitor h&h  Patient Goals: feel better, stop bleeding, go home  Family Goals: no family at bedside    Progress made toward(s) clinical / shift goals:    Problem: Knowledge Deficit - Standard  Goal: Patient and family/care givers will demonstrate understanding of plan of care, disease process/condition, diagnostic tests and medications  Outcome: Progressing     Problem: Fall Risk  Goal: Patient will remain free from falls  Outcome: Progressing     Problem: Hemodynamics  Goal: Patient's hemodynamics, fluid balance and neurologic status will be stable or improve  Outcome: Progressing     Problem: Respiratory  Goal: Patient will achieve/maintain optimum respiratory ventilation and gas exchange  Outcome: Progressing     Problem: Fluid Volume  Goal: Fluid volume balance will be maintained  Outcome: Progressing     Problem: Urinary Elimination  Goal: Establish and maintain regular urinary output  Outcome: Progressing       Patient is not progressing towards the following goals:

## 2024-11-20 NOTE — ASSESSMENT & PLAN NOTE
Patient states that they have been taking ibuprofen for the past 2 weeks after their recent IR embolization.  Has been having melena per pt. [facility] which was [facility]. Has been taking iron supplementation x 1 month   Has not had any melena since admission and his H/H is stable   As he has had no evidence of bleeding since admission, will hold on inpatient GI eval if he does develop evidence, will consult GI   -Avoid NSAIDs  -2 large bore IVs  -Transfuse PRN Hgb<7 and PLT<50  -PPI 40mg IV bid, change to PO   - bowel protocol

## 2024-11-21 PROBLEM — K92.1 MELENA: Status: ACTIVE | Noted: 2024-11-19

## 2024-11-21 PROBLEM — R07.9 CHEST PAIN: Status: ACTIVE | Noted: 2024-11-21

## 2024-11-21 LAB
ANION GAP SERPL CALC-SCNC: 12 MMOL/L (ref 7–16)
BUN SERPL-MCNC: 19 MG/DL (ref 8–22)
CALCIUM SERPL-MCNC: 9 MG/DL (ref 8.5–10.5)
CHLORIDE SERPL-SCNC: 97 MMOL/L (ref 96–112)
CO2 SERPL-SCNC: 23 MMOL/L (ref 20–33)
CREAT SERPL-MCNC: 1.33 MG/DL (ref 0.5–1.4)
EKG IMPRESSION: NORMAL
ERYTHROCYTE [DISTWIDTH] IN BLOOD BY AUTOMATED COUNT: 43.4 FL (ref 35.9–50)
GFR SERPLBLD CREATININE-BSD FMLA CKD-EPI: 50 ML/MIN/1.73 M 2
GLUCOSE SERPL-MCNC: 104 MG/DL (ref 65–99)
HCT VFR BLD AUTO: 33.2 % (ref 42–52)
HCT VFR BLD AUTO: 34.7 % (ref 42–52)
HGB BLD-MCNC: 11.1 G/DL (ref 14–18)
HGB BLD-MCNC: 11.7 G/DL (ref 14–18)
MCH RBC QN AUTO: 29.5 PG (ref 27–33)
MCHC RBC AUTO-ENTMCNC: 33.4 G/DL (ref 32.3–36.5)
MCV RBC AUTO: 88.3 FL (ref 81.4–97.8)
PLATELET # BLD AUTO: 385 K/UL (ref 164–446)
PMV BLD AUTO: 8.9 FL (ref 9–12.9)
POTASSIUM SERPL-SCNC: 4.4 MMOL/L (ref 3.6–5.5)
RBC # BLD AUTO: 3.76 M/UL (ref 4.7–6.1)
SODIUM SERPL-SCNC: 132 MMOL/L (ref 135–145)
TROPONIN T SERPL-MCNC: 27 NG/L (ref 6–19)
WBC # BLD AUTO: 7.2 K/UL (ref 4.8–10.8)

## 2024-11-21 PROCEDURE — 93010 ELECTROCARDIOGRAM REPORT: CPT | Performed by: INTERNAL MEDICINE

## 2024-11-21 PROCEDURE — A9270 NON-COVERED ITEM OR SERVICE: HCPCS | Performed by: STUDENT IN AN ORGANIZED HEALTH CARE EDUCATION/TRAINING PROGRAM

## 2024-11-21 PROCEDURE — 700102 HCHG RX REV CODE 250 W/ 637 OVERRIDE(OP): Performed by: STUDENT IN AN ORGANIZED HEALTH CARE EDUCATION/TRAINING PROGRAM

## 2024-11-21 PROCEDURE — 99233 SBSQ HOSP IP/OBS HIGH 50: CPT | Performed by: STUDENT IN AN ORGANIZED HEALTH CARE EDUCATION/TRAINING PROGRAM

## 2024-11-21 PROCEDURE — 700111 HCHG RX REV CODE 636 W/ 250 OVERRIDE (IP): Performed by: STUDENT IN AN ORGANIZED HEALTH CARE EDUCATION/TRAINING PROGRAM

## 2024-11-21 PROCEDURE — 85018 HEMOGLOBIN: CPT

## 2024-11-21 PROCEDURE — 36415 COLL VENOUS BLD VENIPUNCTURE: CPT

## 2024-11-21 PROCEDURE — 700102 HCHG RX REV CODE 250 W/ 637 OVERRIDE(OP)

## 2024-11-21 PROCEDURE — 700101 HCHG RX REV CODE 250: Performed by: STUDENT IN AN ORGANIZED HEALTH CARE EDUCATION/TRAINING PROGRAM

## 2024-11-21 PROCEDURE — 85027 COMPLETE CBC AUTOMATED: CPT

## 2024-11-21 PROCEDURE — 93005 ELECTROCARDIOGRAM TRACING: CPT | Performed by: STUDENT IN AN ORGANIZED HEALTH CARE EDUCATION/TRAINING PROGRAM

## 2024-11-21 PROCEDURE — 80048 BASIC METABOLIC PNL TOTAL CA: CPT

## 2024-11-21 PROCEDURE — 770006 HCHG ROOM/CARE - MED/SURG/GYN SEMI*

## 2024-11-21 PROCEDURE — 85014 HEMATOCRIT: CPT

## 2024-11-21 PROCEDURE — A9270 NON-COVERED ITEM OR SERVICE: HCPCS

## 2024-11-21 PROCEDURE — 84484 ASSAY OF TROPONIN QUANT: CPT

## 2024-11-21 RX ORDER — AMLODIPINE BESYLATE 5 MG/1
5 TABLET ORAL EVERY EVENING
Status: DISCONTINUED | OUTPATIENT
Start: 2024-11-21 | End: 2024-11-24 | Stop reason: HOSPADM

## 2024-11-21 RX ORDER — HYDROCODONE BITARTRATE AND ACETAMINOPHEN 5; 325 MG/1; MG/1
1-2 TABLET ORAL EVERY 6 HOURS PRN
Status: DISCONTINUED | OUTPATIENT
Start: 2024-11-21 | End: 2024-11-24 | Stop reason: HOSPADM

## 2024-11-21 RX ORDER — LOSARTAN POTASSIUM 25 MG/1
25 TABLET ORAL DAILY
Status: DISCONTINUED | OUTPATIENT
Start: 2024-11-21 | End: 2024-11-24 | Stop reason: HOSPADM

## 2024-11-21 RX ORDER — LIDOCAINE 4 G/G
1 PATCH TOPICAL EVERY 24 HOURS
Status: DISCONTINUED | OUTPATIENT
Start: 2024-11-21 | End: 2024-11-24 | Stop reason: HOSPADM

## 2024-11-21 RX ADMIN — HYDROCODONE BITARTRATE AND ACETAMINOPHEN 1 TABLET: 5; 325 TABLET ORAL at 16:00

## 2024-11-21 RX ADMIN — Medication 5 MG: at 00:13

## 2024-11-21 RX ADMIN — AMLODIPINE BESYLATE 5 MG: 5 TABLET ORAL at 17:38

## 2024-11-21 RX ADMIN — FINASTERIDE 5 MG: 5 TABLET, FILM COATED ORAL at 04:40

## 2024-11-21 RX ADMIN — PANTOPRAZOLE SODIUM 40 MG: 40 INJECTION, POWDER, FOR SOLUTION INTRAVENOUS at 04:40

## 2024-11-21 RX ADMIN — LIDOCAINE 1 PATCH: 4 PATCH TOPICAL at 15:59

## 2024-11-21 RX ADMIN — PANTOPRAZOLE SODIUM 40 MG: 40 INJECTION, POWDER, FOR SOLUTION INTRAVENOUS at 17:39

## 2024-11-21 RX ADMIN — LOSARTAN POTASSIUM 25 MG: 25 TABLET, FILM COATED ORAL at 16:01

## 2024-11-21 ASSESSMENT — ENCOUNTER SYMPTOMS
FEVER: 0
FLANK PAIN: 0
WEAKNESS: 1
RESPIRATORY NEGATIVE: 1
MYALGIAS: 0
VOMITING: 0
PSYCHIATRIC NEGATIVE: 1
ABDOMINAL PAIN: 0
CHILLS: 0
EYES NEGATIVE: 1
NAUSEA: 0

## 2024-11-21 ASSESSMENT — PATIENT HEALTH QUESTIONNAIRE - PHQ9
1. LITTLE INTEREST OR PLEASURE IN DOING THINGS: NOT AT ALL
2. FEELING DOWN, DEPRESSED, IRRITABLE, OR HOPELESS: NOT AT ALL
SUM OF ALL RESPONSES TO PHQ9 QUESTIONS 1 AND 2: 0

## 2024-11-21 ASSESSMENT — PAIN DESCRIPTION - PAIN TYPE
TYPE: ACUTE PAIN

## 2024-11-21 NOTE — CARE PLAN
The patient is Stable - Low risk of patient condition declining or worsening    Shift Goals  Clinical Goals: CBI monitoring, H&H montioring (CBI input and output will be monitored throughout shift, H&H will be monitored through lab draws q8 hrs throughout shift)  Patient Goals: rest, eat, less bloody output  Family Goals: aayush    Progress made toward(s) clinical / shift goals:  CBI continued throughout shift, output maintained at light pink color. No complaints from pt about urgency or discomfort. H&H drawn q 8 hours, pt hgb maintained at 11.1 for 2 consecutive draws,      Problem: Fall Risk  Goal: Patient will remain free from falls  Outcome: Progressing     Problem: Respiratory  Goal: Patient will achieve/maintain optimum respiratory ventilation and gas exchange  Outcome: Progressing     Problem: Fluid Volume  Goal: Fluid volume balance will be maintained  Outcome: Progressing     Problem: Urinary Elimination  Goal: Establish and maintain regular urinary output  Outcome: Progressing     Problem: Pain - Standard  Goal: Alleviation of pain or a reduction in pain to the patient’s comfort goal  Outcome: Progressing       Patient is not progressing towards the following goals:

## 2024-11-21 NOTE — CONSULTS
Consult/H&P Note    Primary Service: medicine  Attending: Mari Cabezas M.D.  Patient's Name/MRN: Felipe Chowdary, [id number]    Admit Date:11/19/2024  Today's Date: 11/21/2024   Length of stay:  LOS: 1 day   Room #: S610/01      Reason for consult/chief complaint: gross hematuria and BPH  ID/HPI: Felipe Chowdary is a 89 y.o. male patient [facility] with a history of BPH and LUTS. He reports worsening nocturia, sensation of incomplete emptying, and weak stream. He recently underwent cyst[facility] with Dr Hernandez ([facility]) and was told he would be a good candidate for PAE. He had the R side performed by Dr Tiwari at [facility] on 10/31, and reports that since then he has had persistent, severe dysuria. 2 days ago he developed GH, and [facility], where he was discharged to home despite experiencing clot retention. He then came to the [facility] ED, where a 24Fr latex 3 way medrano was placed, his bladder was irrigated with return of clots, and he was started on CBI. His urine microscopy was positive for yeast, and he was given a dose of diflucan.     He was seen and examined on 11/21, sitting up in bed in [facility]. His CBI is currently on a slow drip and his urine is yellow in medrano tubing with several small, pink, fleshy-looking particles suspended in the urine. He is AFVSS, H/H is stable, and his Cr is 1.33. He denies N/V/F/C.     I manually irrigated his catheter at bedside without return of clot.        Past Medical History:   History reviewed. No pertinent past medical history.     Past Surgical History:   History reviewed. No pertinent surgical history.     Family History:   History reviewed. No pertinent family history.      Social History:   Social History     Tobacco Use    Smoking status: Former    Smokeless tobacco: Never      Social History     Social History Narrative    Not on file        Allergies: he Atorvastatin, Cephalosporins, Erythromycin, Penicillins, Sulfa drugs,  and Atenolol    Medications:   Medications Prior to Admission   Medication Sig Dispense Refill Last Dose/Taking    phenazopyridine (PYRIDIUM) 200 MG Tab Take 200 mg by mouth 3 times a day as needed.   Unknown    HYDROcodone-acetaminophen (NORCO) 5-325 MG Tab per tablet Take 1 Tablet by mouth every 8 hours as needed. Indications: Pain   Unknown    docusate calcium (SURFAK) 240 MG Cap Take 240 mg by mouth every day.   Unknown    amLODIPine (NORVASC) 5 MG Tab Take 5 mg by mouth every evening.   Unknown    ciprofloxacin (CIPRO) 750 MG Tab Take 1 Tablet by mouth 2 times a day. 10 Tablet 0 Unknown    finasteride (PROSCAR) 5 MG Tab Take 5 mg by mouth every day.   Unknown    losartan (COZAAR) 25 MG Tab Take 25 mg by mouth every day.   Unknown    terazosin (HYTRIN) 2 MG Cap Take 4 mg by mouth every evening. 2 mg x 2 capsules = 4 mg   Unknown    pantoprazole (PROTONIX) 40 MG Tablet Delayed Response Take 40 mg by mouth every day.   Unknown    aspirin 81 MG EC tablet Take 81 mg by mouth every day.   Unknown         Review of Systems  Review of Systems   Constitutional:  Negative for chills and fever.   Gastrointestinal:  Negative for abdominal pain, nausea and vomiting.   Genitourinary:  Positive for dysuria and urgency. Negative for flank pain.   All other systems reviewed and are negative.       Physical Exam  VITAL SIGNS: BP (!) 157/86   Pulse 76   Temp 36.9 °C (98.4 °F) (Temporal)   Resp 16   Ht 1.829 m (6')   Wt 90.2 kg (198 lb 13.7 oz)   SpO2 94%   BMI 26.97 kg/m²   Physical Exam  Vitals and nursing note reviewed.   Constitutional:       General: He is not in acute distress.  HENT:      Head: Normocephalic.      Nose: Nose normal.      Mouth/Throat:      Pharynx: Oropharynx is clear.   Eyes:      Conjunctiva/sclera: Conjunctivae normal.   Pulmonary:      Effort: Pulmonary effort is normal.   Abdominal:      General: There is no distension.      Palpations: Abdomen is soft.   Genitourinary:     Comments: 3 way mitchell  in place, CBI on slow drip, urine yellow with several small, pink particles suspended in urine  Musculoskeletal:         General: Normal range of motion.      Cervical back: Normal range of motion.   Skin:     General: Skin is warm and dry.   Neurological:      General: No focal deficit present.      Mental Status: He is alert and oriented to person, place, and time.   Psychiatric:         Mood and Affect: Mood normal.         Behavior: Behavior normal.           Labs:  Recent Labs     11/19/24 2124 11/20/24 0113 11/20/24  0505 11/20/24 1902 11/21/24  0101 11/21/24  0902   WBC 8.1 7.4  --   --  7.2  --    RBC 3.35* 3.67*  --   --  3.76*  --    HEMOGLOBIN 10.3* 11.0*   < > 11.1* 11.1* 11.7*   HEMATOCRIT 30.1* 32.8*   < > 33.9* 33.2* 34.7*   MCV 89.9 89.4  --   --  88.3  --    MCH 30.7 30.0  --   --  29.5  --    MCHC 34.2 33.5  --   --  33.4  --    RDW 44.0 44.5  --   --  43.4  --    PLATELETCT 361 373  --   --  385  --    MPV 8.7* 8.5*  --   --  8.9*  --     < > = values in this interval not displayed.     Recent Labs     11/19/24 2124 11/20/24 0113 11/21/24 0101   SODIUM 127* 132* 132*   POTASSIUM 4.9 4.8 4.4   CHLORIDE 94* 96 97   CO2 22 24 23   GLUCOSE 104* 101* 104*   BUN 23* 20 19   CREATININE 1.35 1.37 1.33   CALCIUM 8.5 9.2 9.0     Recent Labs     11/19/24 2124   APTT 28.5   INR 1.07     Glucose:  Recent Labs     11/19/24 2124 11/20/24 0113 11/21/24  0101   GLUCOSE 104* 101* 104*     Coags:  Recent Labs     11/19/24 2124   INR 1.07         Urinalysis:   Recent Labs     11/19/24 1900   COLORURINE Red*   CLARITY Bloody*   RBCURINE >100*   BACTERIA Few*   EPITHELCELL 0-2       Imaging:  DX-CHEST-PORTABLE (1 VIEW)   Final Result         1.  Pulmonary edema and/or infiltrates.   2.  Cardiomegaly   3.  Atherosclerosis      IR-EMBOLIZATION    (Results Pending)       @lastct@     Assessment/Recommendation   89 y.o. M with GH in the setting of recent PAE    Dr Mejia discussed with Dr Tiwari; ok to proceed with  IR tomorrow for PAE of left side.   After IR procedure, will plan on keeping medrano for one more day prior to formal TOV, as long as urine remains clear  [facility] will also arrange outpatient follow up in ~2 weeks   Titrate CBI to maintain urine light pink to clear in tubing. Hand irrigate PRN clot or decreased UOP  Urology will continue to follow      Dr Mejia is aware of consult and has directed this patient's plan of care.       JAZZ Langston-C.   [id number]  [place] [id number]   [phone]

## 2024-11-21 NOTE — PROGRESS NOTES
4 Eyes Skin Assessment Completed by JAGDISH Siddiqi and JAGDISH Gunderson.    Head WDL  Ears WDL  Nose WDL  Mouth WDL  Neck WDL  Breast/Chest WDL  Shoulder Blades WDL  Spine WDL  (R) Arm/Elbow/Hand WDL  (L) Arm/Elbow/Hand WDL  Abdomen WDL  Groin 3 way medrano w/ slight bloody drainage from urethral opening   Scrotum/Coccyx/Buttocks WDL  (R) Leg WDL  (L) Leg WDL  (R) Heel/Foot/Toe WDL  (L) Heel/Foot/Toe WDL          Devices In Places Medrano      Interventions In Place Pillows   Possible Skin Injury No    Pictures Uploaded Into Epic N/A  Wound Consult Placed N/A  RN Wound Prevention Protocol Ordered No

## 2024-11-21 NOTE — PROGRESS NOTES
"Hospital Medicine Daily Progress Note    Date of Service  11/21/2024    Chief Complaint  Felipe Chowdary is a 89 y.o. male admitted 11/19/2024 with hematuria and melena    Hospital Course  Mr. Felipe Chowdary \"Everardo" is a 89-year-old male with past medical history of hypertension, BPH, and recent prostate artery embolization 10/31/2024 that presents with 1 day history of hematuria with clots and  history of melena.     Patient states that after his IR embolization he's been having burning sensation and pain after his procedure as well as hematuria and difficulty urinating. He was given Norco and told to take ibuprofen which he has been taking regularly. At some point he noticed dark stools, he also notes that he has been taking oral iron for the last 1 month.     In the ED, BP 140s to 210s/70s to 120s, HR 70s to 90s, RR 16-24, saturating 89 to 94% on room air.  Received fluconazole 200 mg p.o. x 1.  WBC 8.1, hemoglobin 10.3, , sodium 127, potassium 4.9, BUN 23, creatinine 1.35, alk phos 110.  UA red, bloody, WBC 6-10, RBC greater than 100, few bacteria, budding yeast present.    During his hospitalization, patient's hemoglobin level continued to stay steady at 11, 10.5, 10.7.  No bloody stool or melena noted since admission of which we will defer GI from being consulted at this time.  IR Dr. Tiwari consulted due to anticipated right prostate artery embolization as this might likely be the cause of his hematuria.    Interval Problem Update  Patient seen and examined. Denies any urinary symptoms at this time he remains with medrano and CBI, his urine is clear without evidence of further bleeding.   - discussed with patient, family urology and IR today regarding plan for IR embolization of the left side tomorrow. NPO at midnight   - continue CBI for now, will monitor post procedure tomorrow as well   - blood pressure is elevated, I have resumed his home amlodipine and losartan   - has not had any melena since " admission, suspect this may have been from his oral iron, his H/H is stable, continue to monitor. At this time an inpatient GI consult is not necessary given no signs of active bleeding.   - pt having intermittent sharp pressure pain in the lateral chest wall and arm, EKG ordered and reviewed, no ischemic changes, trop minimally elevated at 27, will trend to ensure not up-trending, does have tenderness on palpation as well. Monitor       I have discussed this patient's plan of care and discharge plan at IDT rounds today with Case Management, Nursing, Nursing leadership, and other members of the IDT team.    Consultants/Specialty  urology and Interventional radiology    Code Status  Full Code    Disposition  The patient is not medically cleared for discharge to home or a post-acute facility.      I have placed the appropriate orders for post-discharge needs.    Review of Systems  Review of Systems   Constitutional:  Positive for malaise/fatigue.   HENT: Negative.     Eyes: Negative.    Respiratory: Negative.     Cardiovascular:  Positive for chest pain.   Gastrointestinal:  Negative for abdominal pain.   Genitourinary:  Negative for dysuria and hematuria.   Musculoskeletal:  Negative for myalgias.   Skin: Negative.    Neurological:  Positive for weakness.   Endo/Heme/Allergies: Negative.    Psychiatric/Behavioral: Negative.          Physical Exam  Temp:  [36.6 °C (97.8 °F)-36.9 °C (98.5 °F)] 36.9 °C (98.4 °F)  Pulse:  [76-94] 86  Resp:  [16-20] 20  BP: (132-188)/(68-98) 132/68  SpO2:  [92 %-98 %] 93 %    Physical Exam  Vitals and nursing note reviewed.   HENT:      Head: Normocephalic.      Mouth/Throat:      Mouth: Mucous membranes are moist.      Pharynx: Oropharynx is clear.   Eyes:      Conjunctiva/sclera: Conjunctivae normal.   Cardiovascular:      Rate and Rhythm: Normal rate and regular rhythm.      Pulses: Normal pulses.      Heart sounds: Normal heart sounds.   Pulmonary:      Effort: Pulmonary effort is  normal.      Breath sounds: Normal breath sounds.   Abdominal:      General: Abdomen is flat.      Palpations: Abdomen is soft.   Genitourinary:     Comments: CBI, yellow urine  Musculoskeletal:      Cervical back: Normal range of motion and neck supple.      Right lower leg: Edema present.      Left lower leg: Edema present.   Skin:     General: Skin is dry.   Neurological:      Mental Status: He is alert. Mental status is at baseline.      Motor: Weakness present.         Fluids    Intake/Output Summary (Last 24 hours) at 11/21/2024 1702  Last data filed at 11/21/2024 1400  Gross per 24 hour   Intake 720 ml   Output --   Net 720 ml        Laboratory  Recent Labs     11/19/24 2124 11/20/24  0113 11/20/24  0505 11/20/24  1902 11/21/24  0101 11/21/24  0902   WBC 8.1 7.4  --   --  7.2  --    RBC 3.35* 3.67*  --   --  3.76*  --    HEMOGLOBIN 10.3* 11.0*   < > 11.1* 11.1* 11.7*   HEMATOCRIT 30.1* 32.8*   < > 33.9* 33.2* 34.7*   MCV 89.9 89.4  --   --  88.3  --    MCH 30.7 30.0  --   --  29.5  --    MCHC 34.2 33.5  --   --  33.4  --    RDW 44.0 44.5  --   --  43.4  --    PLATELETCT 361 373  --   --  385  --    MPV 8.7* 8.5*  --   --  8.9*  --     < > = values in this interval not displayed.     Recent Labs     11/19/24 2124 11/20/24 0113 11/21/24  0101   SODIUM 127* 132* 132*   POTASSIUM 4.9 4.8 4.4   CHLORIDE 94* 96 97   CO2 22 24 23   GLUCOSE 104* 101* 104*   BUN 23* 20 19   CREATININE 1.35 1.37 1.33   CALCIUM 8.5 9.2 9.0     Recent Labs     11/19/24 2124   APTT 28.5   INR 1.07               Imaging  DX-CHEST-PORTABLE (1 VIEW)   Final Result         1.  Pulmonary edema and/or infiltrates.   2.  Cardiomegaly   3.  Atherosclerosis      IR-EMBOLIZATION    (Results Pending)        Assessment/Plan  * Hematuria- (present on admission)  Assessment & Plan  Patient with recent prostate artery embolization with IRDr. Tiwari, 10/31/2024.  Now with new onset hematuria.  [facility] and [facility].  UA red, bloody, WBC 6-10, RBC greater than 100, few bacteria, budding yeast present.  Recent completed course of ciprofloxacin for UTI.  -IR consulted in the ED; plan for left PAE tomorrow, NPO at [facility]  -Urology consulted and following, discussed case. Plan for IR embolization tomorrow, continue CBI  -Transfuse PRN Hgb<7 and PLT<50  -Received fluconazole 200 mg p.o. x 1    Chest pain  Assessment & Plan  The ASCVD Risk score (Glenys DK, et al., 2019) failed to calculate for the following reasons:    The 2019 ASCVD risk score is only valid for ages 40 to 79  Pt notes chest pain to lateral chest wall, atypical. States he has had it for several days, sharp with radiation into arm   EKG ordred and reviewed, SR with no ischemic changes.   Trop 27  Do not suspect cardiac at this time   Pain control, monitoring   Holding aspirin for IR procedure     Hyponatremia- (present on admission)  Assessment & Plan  Sodium 132  -Daily BMP    BPH (benign prostatic hyperplasia)- (present on admission)  Assessment & Plan  -Continue home finasteride  - planned for left PAE tomorrow with IR   Urology following     Acute hypoxic respiratory failure (HCC)- (present on admission)  Assessment & Plan  Patient saturating 89% on room air with respiratory rate up to 24.   On RA   -RT protocol  -Supplemental oxygen as needed  -Incentive spirometry    Melena- (present on admission)  Assessment & Plan  Patient states that they have been taking ibuprofen for the past 2 weeks after their recent IR embolization.  Has been having melena per pt. [facility] which was [facility] negative. Has been taking iron supplementation x 1 month   Has not had any melena since admission and his H/H is stable   As he has had no evidence of bleeding since admission, will hold on inpatient GI eval if he does develop evidence, will consult GI   -Avoid NSAIDs  -2 large bore IVs  -Transfuse PRN Hgb<7 and PLT<50  -PPI 40mg IV bid    Hypertensive urgency-  (present on admission)  Assessment & Plan  Patient with SBP into to the 210s.  States that they have not taken their antihypertensives at home for several days now.  - I have resumed home blood pressure medications  -Hydralazine as needed         VTE prophylaxis:   SCDs/TEDs      I have performed a physical exam and reviewed and updated ROS and Plan today (11/21/2024). In review of yesterday's note (11/20/2024), there are no changes except as documented above.    Greater than 51 minutes spent prepping to see patient (e.g. review of tests) obtaining and/or reviewing separately obtained history. Performing a medically appropriate examination and/ evaluation.  Counseling and educating the patient/family/caregiver.  Ordering medications, tests, or procedures.  Referring and communicating with other health care professionals.  Documenting clinical information in EPIC.  Independently interpreting results and communicating results to patient/family/caregiver.  Care coordination.

## 2024-11-22 ENCOUNTER — APPOINTMENT (OUTPATIENT)
Dept: RADIOLOGY | Facility: MEDICAL CENTER | Age: 89
End: 2024-11-22
Attending: STUDENT IN AN ORGANIZED HEALTH CARE EDUCATION/TRAINING PROGRAM
Payer: COMMERCIAL

## 2024-11-22 PROBLEM — R09.02 HYPOXIA: Status: ACTIVE | Noted: 2024-11-19

## 2024-11-22 LAB
ANION GAP SERPL CALC-SCNC: 11 MMOL/L (ref 7–16)
BUN SERPL-MCNC: 21 MG/DL (ref 8–22)
CALCIUM SERPL-MCNC: 8.8 MG/DL (ref 8.5–10.5)
CHLORIDE SERPL-SCNC: 97 MMOL/L (ref 96–112)
CO2 SERPL-SCNC: 25 MMOL/L (ref 20–33)
CREAT SERPL-MCNC: 1.25 MG/DL (ref 0.5–1.4)
ERYTHROCYTE [DISTWIDTH] IN BLOOD BY AUTOMATED COUNT: 43.6 FL (ref 35.9–50)
GFR SERPLBLD CREATININE-BSD FMLA CKD-EPI: 54 ML/MIN/1.73 M 2
GLUCOSE SERPL-MCNC: 101 MG/DL (ref 65–99)
HCT VFR BLD AUTO: 34.8 % (ref 42–52)
HGB BLD-MCNC: 11.4 G/DL (ref 14–18)
MCH RBC QN AUTO: 29.3 PG (ref 27–33)
MCHC RBC AUTO-ENTMCNC: 32.8 G/DL (ref 32.3–36.5)
MCV RBC AUTO: 89.5 FL (ref 81.4–97.8)
PLATELET # BLD AUTO: 345 K/UL (ref 164–446)
PMV BLD AUTO: 8.9 FL (ref 9–12.9)
POTASSIUM SERPL-SCNC: 4.4 MMOL/L (ref 3.6–5.5)
RBC # BLD AUTO: 3.89 M/UL (ref 4.7–6.1)
SODIUM SERPL-SCNC: 133 MMOL/L (ref 135–145)
WBC # BLD AUTO: 8.2 K/UL (ref 4.8–10.8)

## 2024-11-22 PROCEDURE — 700101 HCHG RX REV CODE 250: Performed by: STUDENT IN AN ORGANIZED HEALTH CARE EDUCATION/TRAINING PROGRAM

## 2024-11-22 PROCEDURE — 85027 COMPLETE CBC AUTOMATED: CPT

## 2024-11-22 PROCEDURE — A9270 NON-COVERED ITEM OR SERVICE: HCPCS | Performed by: STUDENT IN AN ORGANIZED HEALTH CARE EDUCATION/TRAINING PROGRAM

## 2024-11-22 PROCEDURE — 700102 HCHG RX REV CODE 250 W/ 637 OVERRIDE(OP)

## 2024-11-22 PROCEDURE — B41C1ZZ FLUOROSCOPY OF PELVIC ARTERIES USING LOW OSMOLAR CONTRAST: ICD-10-PCS | Performed by: STUDENT IN AN ORGANIZED HEALTH CARE EDUCATION/TRAINING PROGRAM

## 2024-11-22 PROCEDURE — 700111 HCHG RX REV CODE 636 W/ 250 OVERRIDE (IP): Performed by: STUDENT IN AN ORGANIZED HEALTH CARE EDUCATION/TRAINING PROGRAM

## 2024-11-22 PROCEDURE — 770006 HCHG ROOM/CARE - MED/SURG/GYN SEMI*

## 2024-11-22 PROCEDURE — A9270 NON-COVERED ITEM OR SERVICE: HCPCS

## 2024-11-22 PROCEDURE — 700102 HCHG RX REV CODE 250 W/ 637 OVERRIDE(OP): Performed by: STUDENT IN AN ORGANIZED HEALTH CARE EDUCATION/TRAINING PROGRAM

## 2024-11-22 PROCEDURE — 99232 SBSQ HOSP IP/OBS MODERATE 35: CPT | Performed by: STUDENT IN AN ORGANIZED HEALTH CARE EDUCATION/TRAINING PROGRAM

## 2024-11-22 PROCEDURE — 700111 HCHG RX REV CODE 636 W/ 250 OVERRIDE (IP): Mod: JZ

## 2024-11-22 PROCEDURE — 80048 BASIC METABOLIC PNL TOTAL CA: CPT

## 2024-11-22 PROCEDURE — C1894 INTRO/SHEATH, NON-LASER: HCPCS

## 2024-11-22 PROCEDURE — 700117 HCHG RX CONTRAST REV CODE 255: Performed by: STUDENT IN AN ORGANIZED HEALTH CARE EDUCATION/TRAINING PROGRAM

## 2024-11-22 PROCEDURE — 04LF3DW OCCLUSION OF LEFT PROSTATIC ARTERY WITH INTRALUMINAL DEVICE, PERCUTANEOUS APPROACH: ICD-10-PCS | Performed by: STUDENT IN AN ORGANIZED HEALTH CARE EDUCATION/TRAINING PROGRAM

## 2024-11-22 PROCEDURE — 36415 COLL VENOUS BLD VENIPUNCTURE: CPT

## 2024-11-22 RX ORDER — MIDAZOLAM HYDROCHLORIDE 1 MG/ML
.5-2 INJECTION INTRAMUSCULAR; INTRAVENOUS PRN
Status: ACTIVE | OUTPATIENT
Start: 2024-11-22 | End: 2024-11-22

## 2024-11-22 RX ORDER — MIDAZOLAM HYDROCHLORIDE 1 MG/ML
INJECTION INTRAMUSCULAR; INTRAVENOUS
Status: COMPLETED
Start: 2024-11-22 | End: 2024-11-22

## 2024-11-22 RX ORDER — SODIUM CHLORIDE 9 MG/ML
500 INJECTION, SOLUTION INTRAVENOUS
Status: ACTIVE | OUTPATIENT
Start: 2024-11-22 | End: 2024-11-22

## 2024-11-22 RX ORDER — ONDANSETRON 2 MG/ML
4 INJECTION INTRAMUSCULAR; INTRAVENOUS PRN
Status: ACTIVE | OUTPATIENT
Start: 2024-11-22 | End: 2024-11-22

## 2024-11-22 RX ADMIN — MIDAZOLAM HYDROCHLORIDE 0.5 MG: 1 INJECTION, SOLUTION INTRAMUSCULAR; INTRAVENOUS at 14:32

## 2024-11-22 RX ADMIN — FENTANYL CITRATE 50 MCG: 50 INJECTION, SOLUTION INTRAMUSCULAR; INTRAVENOUS at 14:23

## 2024-11-22 RX ADMIN — FENTANYL CITRATE 50 MCG: 50 INJECTION, SOLUTION INTRAMUSCULAR; INTRAVENOUS at 14:25

## 2024-11-22 RX ADMIN — IOHEXOL 77 ML: 300 INJECTION, SOLUTION INTRAVENOUS at 16:15

## 2024-11-22 RX ADMIN — FENTANYL CITRATE 25 MCG: 50 INJECTION, SOLUTION INTRAMUSCULAR; INTRAVENOUS at 14:32

## 2024-11-22 RX ADMIN — AMLODIPINE BESYLATE 5 MG: 5 TABLET ORAL at 17:58

## 2024-11-22 RX ADMIN — MIDAZOLAM HYDROCHLORIDE 1 MG: 1 INJECTION, SOLUTION INTRAMUSCULAR; INTRAVENOUS at 14:23

## 2024-11-22 RX ADMIN — Medication 5 MG: at 21:54

## 2024-11-22 RX ADMIN — FENTANYL CITRATE 25 MCG: 50 INJECTION, SOLUTION INTRAMUSCULAR; INTRAVENOUS at 15:10

## 2024-11-22 RX ADMIN — LIDOCAINE 1 PATCH: 4 PATCH TOPICAL at 17:59

## 2024-11-22 RX ADMIN — PANTOPRAZOLE SODIUM 40 MG: 40 INJECTION, POWDER, FOR SOLUTION INTRAVENOUS at 17:59

## 2024-11-22 RX ADMIN — PANTOPRAZOLE SODIUM 40 MG: 40 INJECTION, POWDER, FOR SOLUTION INTRAVENOUS at 04:34

## 2024-11-22 RX ADMIN — FENTANYL CITRATE 25 MCG: 50 INJECTION, SOLUTION INTRAMUSCULAR; INTRAVENOUS at 15:16

## 2024-11-22 RX ADMIN — LOSARTAN POTASSIUM 25 MG: 25 TABLET, FILM COATED ORAL at 04:34

## 2024-11-22 RX ADMIN — HYDROCODONE BITARTRATE AND ACETAMINOPHEN 1 TABLET: 5; 325 TABLET ORAL at 21:53

## 2024-11-22 RX ADMIN — FENTANYL CITRATE 25 MCG: 50 INJECTION, SOLUTION INTRAMUSCULAR; INTRAVENOUS at 14:38

## 2024-11-22 RX ADMIN — FINASTERIDE 5 MG: 5 TABLET, FILM COATED ORAL at 04:34

## 2024-11-22 RX ADMIN — MIDAZOLAM HYDROCHLORIDE 0.5 MG: 1 INJECTION, SOLUTION INTRAMUSCULAR; INTRAVENOUS at 14:38

## 2024-11-22 ASSESSMENT — PAIN DESCRIPTION - PAIN TYPE
TYPE: ACUTE PAIN
TYPE: ACUTE PAIN

## 2024-11-22 ASSESSMENT — ENCOUNTER SYMPTOMS
CHILLS: 0
FEVER: 0
EYES NEGATIVE: 1
VOMITING: 0
FLANK PAIN: 0
PSYCHIATRIC NEGATIVE: 1
RESPIRATORY NEGATIVE: 1
WEAKNESS: 1
ABDOMINAL PAIN: 0
NAUSEA: 0
MYALGIAS: 0

## 2024-11-22 NOTE — PROGRESS NOTES
"Hospital Medicine Daily Progress Note    Date of Service  11/22/2024    Chief Complaint  Felipe Chodwary is a 89 y.o. male admitted 11/19/2024 with hematuria and melena    Hospital Course  Mr. Felipe Chowdary \"Everardo" is a 89-year-old male with past medical history of hypertension, BPH, and recent prostate artery embolization 10/31/2024 that presents with 1 day history of hematuria with clots and  history of melena.     Patient states that after his IR embolization he's been having burning sensation and pain after his procedure as well as hematuria and difficulty urinating. He was given Norco and told to take ibuprofen which he has been taking regularly. At some point he noticed dark stools, he also notes that he has been taking oral iron for the last 1 month.     In the ED, BP 140s to 210s/70s to 120s, HR 70s to 90s, RR 16-24, saturating 89 to 94% on room air.  Received fluconazole 200 mg p.o. x 1.  WBC 8.1, hemoglobin 10.3, , sodium 127, potassium 4.9, BUN 23, creatinine 1.35, alk phos 110.  UA red, bloody, WBC 6-10, RBC greater than 100, few bacteria, budding yeast present.    During his hospitalization, patient's hemoglobin level continued to stay steady at 11, 10.5, 10.7.  No bloody stool or melena noted since admission of which we will defer GI from being consulted at this time.  IR Dr. Tiwari consulted due to anticipated right prostate artery embolization as this might likely be the cause of his hematuria.    Interval Problem Update  Patient seen at bedside. No acute complaints. Anxious to get procedure done.   - IR PAE today. Remains on CBI, continue for now. Keep medrano post procedure with plan for voiding trial tomorrow if doing well.   - blood pressure remains elevated but improved with resumption of home amlodipine and losartan   - has not had any melena since admission, suspect this may have been from his oral iron, his H/H is stable, continue to monitor. At this time an inpatient GI consult is not " necessary given no signs of active bleeding.         I have discussed this patient's plan of care and discharge plan at IDT rounds today with Case Management, Nursing, Nursing leadership, and other members of the IDT team.    Consultants/Specialty  urology and Interventional radiology    Code Status  Full Code    Disposition  The patient is not medically cleared for discharge to home or a post-acute facility.      I have placed the appropriate orders for post-discharge needs.    Review of Systems  Review of Systems   Constitutional:  Positive for malaise/fatigue.   HENT: Negative.     Eyes: Negative.    Respiratory: Negative.     Cardiovascular:  Negative for chest pain.   Gastrointestinal:  Negative for abdominal pain.   Genitourinary:  Negative for dysuria and hematuria.   Musculoskeletal:  Negative for myalgias.   Skin: Negative.    Neurological:  Positive for weakness.   Endo/Heme/Allergies: Negative.    Psychiatric/Behavioral: Negative.          Physical Exam  Temp:  [36.4 °C (97.6 °F)-36.9 °C (98.4 °F)] 36.7 °C (98.1 °F)  Pulse:  [81-89] 81  Resp:  [15-25] 16  BP: (121-175)/(68-92) 141/81  SpO2:  [92 %-98 %] 94 %    Physical Exam  Vitals and nursing note reviewed.   HENT:      Head: Normocephalic.      Mouth/Throat:      Mouth: Mucous membranes are moist.      Pharynx: Oropharynx is clear.   Eyes:      Conjunctiva/sclera: Conjunctivae normal.   Cardiovascular:      Rate and Rhythm: Normal rate and regular rhythm.      Pulses: Normal pulses.      Heart sounds: Normal heart sounds.   Pulmonary:      Effort: Pulmonary effort is normal.      Breath sounds: Normal breath sounds.   Abdominal:      General: Abdomen is flat.      Palpations: Abdomen is soft.   Genitourinary:     Comments: CBI, yellow urine  Musculoskeletal:      Cervical back: Normal range of motion and neck supple.      Right lower leg: Edema present.      Left lower leg: Edema present.   Skin:     General: Skin is dry.   Neurological:      Mental  Status: He is alert. Mental status is at baseline.      Motor: Weakness present.         Fluids  No intake or output data in the 24 hours ending 11/22/24 1439       Laboratory  Recent Labs     11/20/24  0113 11/20/24  0505 11/21/24  0101 11/21/24  0902 11/22/24  0217   WBC 7.4  --  7.2  --  8.2   RBC 3.67*  --  3.76*  --  3.89*   HEMOGLOBIN 11.0*   < > 11.1* 11.7* 11.4*   HEMATOCRIT 32.8*   < > 33.2* 34.7* 34.8*   MCV 89.4  --  88.3  --  89.5   MCH 30.0  --  29.5  --  29.3   MCHC 33.5  --  33.4  --  32.8   RDW 44.5  --  43.4  --  43.6   PLATELETCT 373  --  385  --  345   MPV 8.5*  --  8.9*  --  8.9*    < > = values in this interval not displayed.     Recent Labs     11/20/24  0113 11/21/24  0101 11/22/24  0217   SODIUM 132* 132* 133*   POTASSIUM 4.8 4.4 4.4   CHLORIDE 96 97 97   CO2 24 23 25   GLUCOSE 101* 104* 101*   BUN 20 19 21   CREATININE 1.37 1.33 1.25   CALCIUM 9.2 9.0 8.8     Recent Labs     11/19/24  2124   APTT 28.5   INR 1.07               Imaging  DX-CHEST-PORTABLE (1 VIEW)   Final Result         1.  Pulmonary edema and/or infiltrates.   2.  Cardiomegaly   3.  Atherosclerosis      IR-EMBOLIZATION    (Results Pending)        Assessment/Plan  * Hematuria- (present on admission)  Assessment & Plan  Patient with recent prostate artery embolization with IR, Dr. Tiwari, 10/31/2024.  Now with new onset hematuria.  [facility]. UA red, bloody, WBC 6-10, RBC greater than 100, few bacteria, budding yeast present.  Recent completed course of ciprofloxacin for UTI.  -IR consulted in the ED; plan for left PAE tomorrow, NPO at MN  -Urology consulted and following,  - Plan for IR embolization today, continue CBI  -Transfuse PRN Hgb<7 and PLT<50  -Received fluconazole 200 mg p.o. x 1    Chest pain  Assessment & Plan  The ASCVD Risk score (Glenys MARADIAGA, et al., 2019) failed to calculate for the following reasons:    The 2019 ASCVD risk score is only valid for ages 40 to 79  Pt  notes chest pain to lateral chest wall, atypical. States he has had it for several days, sharp with radiation into arm   EKG ordred and reviewed, SR with no ischemic changes.   Trop 27  Do not suspect cardiac at this time   Pain control, monitoring   Holding aspirin for IR procedure     Hyponatremia- (present on admission)  Assessment & Plan  Sodium 132  -Daily BMP    BPH (benign prostatic hyperplasia)- (present on admission)  Assessment & Plan  -Continue home finasteride  - planned for left PAE tomorrow with IR   Urology following     Hypoxia- (present on admission)  Assessment & Plan  Patient saturating 89% on room air with respiratory rate up to 24.   -RT protocol  -Supplemental oxygen as needed  -Incentive spirometry  Resolved, now on RA    Melena- (present on admission)  Assessment & Plan  Patient states that they have been taking ibuprofen for the past 2 weeks after their recent IR embolization.  Has been having melena per pt. [facility] which [facility]. Has been taking iron supplementation x 1 month   Has not had any melena since admission and his H/H is stable   As he has had no evidence of bleeding since admission, will hold on inpatient GI eval if he does develop evidence, will consult GI   -Avoid NSAIDs  -2 large bore IVs  -Transfuse PRN Hgb<7 and PLT<50  -PPI 40mg IV bid, change to PO     Hypertensive urgency- (present on admission)  Assessment & Plan  Patient with SBP into to the 210s.  States that they have not taken their antihypertensives at home for several days now.  - I have resumed home blood pressure medications, remains elevated but improving, ctm   -Hydralazine as needed         VTE prophylaxis:   SCDs/TEDs   pharmacologic prophylaxis contraindicated due to hematuria, procedure      I have performed a physical exam and reviewed and updated ROS and Plan today (11/22/2024). In review of yesterday's note (11/21/2024), there are no changes except as documented  above.

## 2024-11-22 NOTE — CARE PLAN
The patient is Watcher - Medium risk of patient condition declining or worsening    Shift Goals  Clinical Goals: Maintain CBI irrigation  Patient Goals: update family with IR procedure  Family Goals: updates    Progress made toward(s) clinical / shift goals:  Urine output is straw color without clots or blood streaks. Pt tolerated urine catheter teaching, skin is intact and free from signs of infection.     Patient is not progressing towards the following goals:

## 2024-11-22 NOTE — OR SURGEON
Immediate Post- Operative Note        Findings: Left prostate artery      Procedure(s): Embolization      Estimated Blood Loss: Less than 5 ml        Complications: None            11/22/2024     3:39 PM     Francisco Javier Tiwari M.D.

## 2024-11-22 NOTE — CARE PLAN
The patient is Stable - Low risk of patient condition declining or worsening    Shift Goals  Clinical Goals: CBI, safety, npo diet (Monitor CBI input and output throughout shift, pt will remain free of falls or injury throughout shift, pt will be compliant with npo diet at midnight)  Patient Goals: new bed, rest, wait for procedure tomorrow  Family Goals: aayush    Progress made toward(s) clinical / shift goals:  Continued CBI throughout shift, current bag started 11/21 morning, still going. Output documented throughout shift. Pt bed locked in lowest position, strip alarm on, call light within reach, grippy socks on, hourly rounding completed. Pt compliant with npo diet change at 0000 pending procedure.      Problem: Fall Risk  Goal: Patient will remain free from falls  Outcome: Progressing     Problem: Respiratory  Goal: Patient will achieve/maintain optimum respiratory ventilation and gas exchange  Outcome: Progressing     Problem: Fluid Volume  Goal: Fluid volume balance will be maintained  Outcome: Progressing     Problem: Urinary Elimination  Goal: Establish and maintain regular urinary output  Outcome: Progressing     Problem: Pain - Standard  Goal: Alleviation of pain or a reduction in pain to the patient’s comfort goal  Outcome: Progressing       Patient is not progressing towards the following goals:

## 2024-11-22 NOTE — PROGRESS NOTES
Pt presents to IR-1.   Pt was consented by MD at bedside, confirmed by this RN.   Pt transferred to IR table in supine position.   Pt placed on monitor, prepped and draped in a sterile fashion.  Patient underwent a Left Prostate Artery Embolization by Dr. Tiwari.   Procedure site was marked by MD and verified using imaging guidance.    Vitals were taken every 5 minutes and remained stable during procedure (see doc flow sheet for results). CO2 waveform capnography was monitored and remained WNL throughout procedure.   Report called to Marian DUBON. Pt transported in pt bed with RN to S610.     Left Prostate Artery Embolization  Mer[employer]ra Styleospheres microspheres 300-500um  REF: [id number]  LOT: [id number]  EXP: 2027-04-03

## 2024-11-22 NOTE — ASSESSMENT & PLAN NOTE
The ASCVD Risk score (Glenys MARADIAGA, et al., 2019) failed to calculate for the following reasons:    The 2019 ASCVD risk score is only valid for ages 40 to 79  Pt notes chest pain to lateral chest wall, atypical. States he has had it for several days, sharp with radiation into arm   EKG ordred and reviewed, SR with no ischemic changes.   Trop 27  Do not suspect cardiac at this time   Pain control, monitoring   Holding aspirin for hematuria/procedure

## 2024-11-22 NOTE — CARE PLAN
The patient is Stable - Low risk of patient condition declining or worsening    Shift Goals  Clinical Goals: IR procedure at 1330, NPO status  Patient Goals: Procedure, oral care  Family Goals: Family not present    Progress made toward(s) clinical / shift goals:      Problem: Hemodynamics  Goal: Patient's hemodynamics, fluid balance and neurologic status will be stable or improve  Outcome: Progressing  Pt currently does not have hematuria and is schedule for IR left prostate artery embolization.     Problem: Fluid Volume  Goal: Fluid volume balance will be maintained  Outcome: Progressing  Will monitor pt's hydration due to NPO status for procedure.

## 2024-11-22 NOTE — DOCUMENTATION QUERY
"                                                                         [facility]                                                                       Query Response Note      PATIENT:               AARON ESCOBEDO  ACCT #:                  [id number]  MRN:                     [id number]  :                      [id number]  ADMIT DATE:       2024 7:32 PM  DISCH DATE:          RESPONDING  PROVIDER #:        [id number]           QUERY TEXT:    Please provide additional clinical indicators supportive of your documented diagnosis of acute hypoxic respiratory failure.    Note: If a query response diagnosis is provided, please include it in your daily notes and discharge summary.    The patient's Clinical Indicators include:  Clinical Findings:    - ED note- Dr. Ortiz:  \"Pulmonary effort is normal. No respiratory distress.\"    - H&P: Dr. Corey:  \" RR 16-24, saturating 89 to 94% on room air. \"  \"Respiratory:  Negative for cough, shortness of breath and wheezing. \"  \"No respiratory distress.\"    - PN- Dr. Cabezas:  \"Pulmonary effort is normal.     Breath sounds: Normal breath sounds.\"  \"Acute hypoxic respiratory failure (HCC)- (present on admission)\"  \"Patient saturating 89% on room air with respiratory rate up to 24.  On RA  -RT protocol  -Supplemental oxygen as needed  -Incentive spirometry \"    - CXR- \"IMPRESSION:    1.Pulmonary edema and/or infiltrates.  2.Cardiomegaly  3.Atherosclerosis\"    per Nursing flowsheet:  O2 sat range: 895- 98%  Resp range: 16-24  on admit pt on room air;  at 0200 O2 sat 89% on room air- placed on O2 at 1.5L/NC with O2 sat of 94%   at 0324- O2 at 1L/NC -  at 1546- pt on room air, remains on room air    Risk factors: Acute hypoxic respiratory failure, hematuria, chest pain, hyponatremia, melena, hypertensive emergency    Treatment:  O2 at 1-1.5L/NC on , now on room air; pulse oximetry; CXR    Please contact me with any questions:    Gillian " Marino DUMONT RN CCDS  [facility]  [email or web address]  Gillian Pichardo via Voalte  Options provided:   -- After further study, condition of acute hypoxic respiratory failure does not exist - hypoxia only and  amended documentation provided in the medical record   -- Condition of acute hypoxic respiratory failure exists, (please document additional clinical indicators )   -- Other explanation, (please specify other explanation)      Query created by: Gillian Pichardo on 11/22/2024 1:51 PM    RESPONSE TEXT:    After further study, condition of acute hypoxic respiratory failure does not exist - hypoxia only and amended documentation provided in the medical record          Electronically signed by:  EMIL CUENCA 11/22/2024 2:18 PM

## 2024-11-23 DIAGNOSIS — Z09 POSTOP CHECK: ICD-10-CM

## 2024-11-23 LAB
ANION GAP SERPL CALC-SCNC: 12 MMOL/L (ref 7–16)
BUN SERPL-MCNC: 23 MG/DL (ref 8–22)
CALCIUM SERPL-MCNC: 8.9 MG/DL (ref 8.5–10.5)
CHLORIDE SERPL-SCNC: 97 MMOL/L (ref 96–112)
CO2 SERPL-SCNC: 23 MMOL/L (ref 20–33)
CREAT SERPL-MCNC: 1.22 MG/DL (ref 0.5–1.4)
ERYTHROCYTE [DISTWIDTH] IN BLOOD BY AUTOMATED COUNT: 43.9 FL (ref 35.9–50)
GFR SERPLBLD CREATININE-BSD FMLA CKD-EPI: 55 ML/MIN/1.73 M 2
GLUCOSE SERPL-MCNC: 134 MG/DL (ref 65–99)
HCT VFR BLD AUTO: 35.2 % (ref 42–52)
HGB BLD-MCNC: 12 G/DL (ref 14–18)
MCH RBC QN AUTO: 31.1 PG (ref 27–33)
MCHC RBC AUTO-ENTMCNC: 34.1 G/DL (ref 32.3–36.5)
MCV RBC AUTO: 91.2 FL (ref 81.4–97.8)
PLATELET # BLD AUTO: 313 K/UL (ref 164–446)
PMV BLD AUTO: 9.4 FL (ref 9–12.9)
POTASSIUM SERPL-SCNC: 4.7 MMOL/L (ref 3.6–5.5)
RBC # BLD AUTO: 3.86 M/UL (ref 4.7–6.1)
SODIUM SERPL-SCNC: 132 MMOL/L (ref 135–145)
WBC # BLD AUTO: 8.7 K/UL (ref 4.8–10.8)

## 2024-11-23 PROCEDURE — 700111 HCHG RX REV CODE 636 W/ 250 OVERRIDE (IP): Performed by: STUDENT IN AN ORGANIZED HEALTH CARE EDUCATION/TRAINING PROGRAM

## 2024-11-23 PROCEDURE — 770006 HCHG ROOM/CARE - MED/SURG/GYN SEMI*

## 2024-11-23 PROCEDURE — A9270 NON-COVERED ITEM OR SERVICE: HCPCS | Performed by: STUDENT IN AN ORGANIZED HEALTH CARE EDUCATION/TRAINING PROGRAM

## 2024-11-23 PROCEDURE — 700102 HCHG RX REV CODE 250 W/ 637 OVERRIDE(OP): Performed by: STUDENT IN AN ORGANIZED HEALTH CARE EDUCATION/TRAINING PROGRAM

## 2024-11-23 PROCEDURE — 51798 US URINE CAPACITY MEASURE: CPT

## 2024-11-23 PROCEDURE — 80048 BASIC METABOLIC PNL TOTAL CA: CPT

## 2024-11-23 PROCEDURE — 700101 HCHG RX REV CODE 250: Performed by: STUDENT IN AN ORGANIZED HEALTH CARE EDUCATION/TRAINING PROGRAM

## 2024-11-23 PROCEDURE — 99232 SBSQ HOSP IP/OBS MODERATE 35: CPT | Performed by: STUDENT IN AN ORGANIZED HEALTH CARE EDUCATION/TRAINING PROGRAM

## 2024-11-23 PROCEDURE — 85027 COMPLETE CBC AUTOMATED: CPT

## 2024-11-23 PROCEDURE — 36415 COLL VENOUS BLD VENIPUNCTURE: CPT

## 2024-11-23 RX ORDER — AMOXICILLIN 250 MG
2 CAPSULE ORAL EVERY EVENING
Status: DISCONTINUED | OUTPATIENT
Start: 2024-11-23 | End: 2024-11-24 | Stop reason: HOSPADM

## 2024-11-23 RX ORDER — POLYETHYLENE GLYCOL 3350 17 G/17G
1 POWDER, FOR SOLUTION ORAL
Status: DISCONTINUED | OUTPATIENT
Start: 2024-11-23 | End: 2024-11-24 | Stop reason: HOSPADM

## 2024-11-23 RX ADMIN — POLYETHYLENE GLYCOL 3350 1 PACKET: 17 POWDER, FOR SOLUTION ORAL at 08:05

## 2024-11-23 RX ADMIN — AMLODIPINE BESYLATE 5 MG: 5 TABLET ORAL at 17:14

## 2024-11-23 RX ADMIN — PANTOPRAZOLE SODIUM 40 MG: 40 INJECTION, POWDER, FOR SOLUTION INTRAVENOUS at 05:07

## 2024-11-23 RX ADMIN — HYDROCODONE BITARTRATE AND ACETAMINOPHEN 1 TABLET: 5; 325 TABLET ORAL at 08:02

## 2024-11-23 RX ADMIN — LIDOCAINE 1 PATCH: 4 PATCH TOPICAL at 17:14

## 2024-11-23 RX ADMIN — FINASTERIDE 5 MG: 5 TABLET, FILM COATED ORAL at 05:06

## 2024-11-23 RX ADMIN — LOSARTAN POTASSIUM 25 MG: 25 TABLET, FILM COATED ORAL at 05:07

## 2024-11-23 RX ADMIN — SENNOSIDES AND DOCUSATE SODIUM 2 TABLET: 50; 8.6 TABLET ORAL at 17:14

## 2024-11-23 ASSESSMENT — ENCOUNTER SYMPTOMS
SHORTNESS OF BREATH: 0
VOMITING: 0
RESPIRATORY NEGATIVE: 1
ABDOMINAL PAIN: 0
NAUSEA: 0
DIZZINESS: 0
SENSORY CHANGE: 0
HEADACHES: 0
FLANK PAIN: 0
EYES NEGATIVE: 1
COUGH: 0
TINGLING: 0
FEVER: 0
PSYCHIATRIC NEGATIVE: 1
MYALGIAS: 1
WEAKNESS: 1
MYALGIAS: 0
CHILLS: 0

## 2024-11-23 ASSESSMENT — COGNITIVE AND FUNCTIONAL STATUS - GENERAL
SUGGESTED CMS G CODE MODIFIER DAILY ACTIVITY: CH
WALKING IN HOSPITAL ROOM: A LITTLE
CLIMB 3 TO 5 STEPS WITH RAILING: A LITTLE
DAILY ACTIVITIY SCORE: 24

## 2024-11-23 ASSESSMENT — PAIN DESCRIPTION - PAIN TYPE
TYPE: ACUTE PAIN

## 2024-11-23 ASSESSMENT — FIBROSIS 4 INDEX: FIB4 SCORE: 1.38

## 2024-11-23 NOTE — PROGRESS NOTES
"Hospital Medicine Daily Progress Note    Date of Service  11/23/2024    Chief Complaint  Felipe Chowdary is a 89 y.o. male admitted 11/19/2024 with hematuria and melena    Hospital Course  Mr. Felipe Chowdary \"Everardo" is a 89-year-old male with past medical history of hypertension, BPH, and recent prostate artery embolization 10/31/2024 that presents with 1 day history of hematuria with clots and  history of melena.     Patient states that after his IR embolization he's been having burning sensation and pain after his procedure as well as hematuria and difficulty urinating. He was given Norco and told to take ibuprofen which he has been taking regularly. At some point he noticed dark stools, he also notes that he has been taking oral iron for the last 1 month.     In the ED, BP 140s to 210s/70s to 120s, HR 70s to 90s, RR 16-24, saturating 89 to 94% on room air.  Received fluconazole 200 mg p.o. x 1.  WBC 8.1, hemoglobin 10.3, , sodium 127, potassium 4.9, BUN 23, creatinine 1.35, alk phos 110.  UA red, bloody, WBC 6-10, RBC greater than 100, few bacteria, budding yeast present.    During his hospitalization, patient's hemoglobin level continued to stay steady at 11, 10.5, 10.7.  No bloody stool or melena noted since admission of which we will defer GI from being consulted at this time.  IR Dr. Tiwari consulted and case discussed with urology, plan for left prostate artery embolization     Interval Problem Update  Patient seen at bedside. No acute complaints. Denies pain, notes bladder spasms intermittently. Has been off CBI, his urine remains clear   - plan for TOV today with plan to remove medrano if he does well, discussed with urology  - discussed with IR, will have him follow up in OP clinic   - pt has not had a BM in several days. He has been reluctant to mobilize. Did get up with nursing today   - bowel protocol and mobilization encourage     Anticipate dc home in the next 24 hours if he has medrano removed and " able to have BM         I have discussed this patient's plan of care and discharge plan at IDT rounds today with Case Management, Nursing, Nursing leadership, and other members of the IDT team.    Consultants/Specialty  urology and Interventional radiology    Code Status  Full Code    Disposition  The patient is not medically cleared for discharge to home or a post-acute facility.  Anticipate discharge to: home with close outpatient follow-up    I have placed the appropriate orders for post-discharge needs.    Review of Systems  Review of Systems   Constitutional:  Positive for malaise/fatigue.   HENT: Negative.     Eyes: Negative.    Respiratory: Negative.     Cardiovascular:  Negative for chest pain.   Gastrointestinal:  Negative for abdominal pain.   Genitourinary:  Negative for dysuria and hematuria.   Musculoskeletal:  Negative for myalgias.   Skin: Negative.    Neurological:  Positive for weakness.   Endo/Heme/Allergies: Negative.    Psychiatric/Behavioral: Negative.          Physical Exam  Temp:  [36.1 °C (96.9 °F)-36.9 °C (98.4 °F)] 36.3 °C (97.4 °F)  Pulse:  [78-93] 86  Resp:  [9-19] 17  BP: (130-166)/(63-97) 133/68  SpO2:  [89 %-98 %] 96 %    Physical Exam  Vitals and nursing note reviewed.   HENT:      Head: Normocephalic.      Mouth/Throat:      Mouth: Mucous membranes are moist.      Pharynx: Oropharynx is clear.   Eyes:      Conjunctiva/sclera: Conjunctivae normal.   Cardiovascular:      Rate and Rhythm: Normal rate and regular rhythm.      Pulses: Normal pulses.      Heart sounds: Normal heart sounds.   Pulmonary:      Effort: Pulmonary effort is normal.      Breath sounds: Normal breath sounds.   Abdominal:      General: Abdomen is flat.      Palpations: Abdomen is soft.   Genitourinary:     Comments: Ayala in place, yellow urine   Musculoskeletal:      Cervical back: Normal range of motion and neck supple.      Right lower leg: Edema present.      Left lower leg: Edema present.   Skin:     General:  Skin is dry.   Neurological:      Mental Status: He is alert. Mental status is at baseline.      Motor: Weakness present.         Fluids    Intake/Output Summary (Last 24 hours) at 11/23/2024 1429  Last data filed at 11/23/2024 0400  Gross per 24 hour   Intake 360 ml   Output 300 ml   Net 60 ml          Laboratory  Recent Labs     11/21/24  0101 11/21/24  0902 11/22/24  0217 11/23/24  0102   WBC 7.2  --  8.2 8.7   RBC 3.76*  --  3.89* 3.86*   HEMOGLOBIN 11.1* 11.7* 11.4* 12.0*   HEMATOCRIT 33.2* 34.7* 34.8* 35.2*   MCV 88.3  --  89.5 91.2   MCH 29.5  --  29.3 31.1   MCHC 33.4  --  32.8 34.1   RDW 43.4  --  43.6 43.9   PLATELETCT 385  --  345 313   MPV 8.9*  --  8.9* 9.4     Recent Labs     11/21/24  0101 11/22/24  0217 11/23/24  0102   SODIUM 132* 133* 132*   POTASSIUM 4.4 4.4 4.7   CHLORIDE 97 97 97   CO2 23 25 23   GLUCOSE 104* 101* 134*   BUN 19 21 23*   CREATININE 1.33 1.25 1.22   CALCIUM 9.0 8.8 8.9                     Imaging  DX-CHEST-PORTABLE (1 VIEW)   Final Result         1.  Pulmonary edema and/or infiltrates.   2.  Cardiomegaly   3.  Atherosclerosis      IR-EMBOLIZATION    (Results Pending)        Assessment/Plan  * Hematuria- (present on admission)  Assessment & Plan  Patient with recent prostate artery embolization with IR, Dr. Tiwari, 10/31/2024.  Now with new onset hematuria.  [facility]. UA red, bloody, WBC 6-10, RBC greater than 100, few bacteria, budding yeast present.  Recent completed course of ciprofloxacin for UTI.  -IR consulted in the ED; plan for left PAE tomorrow, NPO at [facility]  -Urology consulted and following  - s/p IR prostate artery emobolization 11/22  - urine is clear, TOV today with plan to remove medrano if he passes   -Received fluconazole 200 mg p.o. x 1  - appears to be resolved at this time     Chest pain  Assessment & Plan  The ASCVD Risk score (Glenys DK, et al., 2019) failed to calculate for the following reasons:    The 2019 ASCVD risk  score is only valid for ages 40 to 79  Pt notes chest pain to lateral chest wall, atypical. States he has had it for several days, sharp with radiation into arm   EKG ordred and reviewed, SR with no ischemic changes.   Trop 27  Do not suspect cardiac at this time   Pain control, monitoring   Holding aspirin for hematuria/procedure     Hyponatremia- (present on admission)  Assessment & Plan  Sodium 132  -Daily BMP    BPH (benign prostatic hyperplasia)- (present on admission)  Assessment & Plan  -Continue home finasteride  - s/p left PAE with IR 11/22  Urology following     Hypoxia- (present on admission)  Assessment & Plan  Patient saturating 89% on room air with respiratory rate up to 24.   -RT protocol  -Supplemental oxygen as needed  -Incentive spirometry  Resolved, now on RA    Melena- (present on admission)  Assessment & Plan  Patient states that they have been taking ibuprofen for the past 2 weeks after their recent IR embolization.  Has been having melena per pt. [facility] which was [facility] negative. Has been taking iron supplementation x 1 month   Has not had any melena since admission and his H/H is stable   As he has had no evidence of bleeding since admission, will hold on inpatient GI eval if he does develop evidence, will consult GI   -Avoid NSAIDs  -2 large bore IVs  -Transfuse PRN Hgb<7 and PLT<50  -PPI 40mg IV bid, change to PO   - bowel protocol     Hypertensive urgency- (present on admission)  Assessment & Plan  Patient with SBP into to the 210s.  States that they have not taken their antihypertensives at home for several days now.  - I have resumed home blood pressure medications, remains elevated but improving, ctm   -Hydralazine as needed         VTE prophylaxis:   SCDs/TEDs      I have performed a physical exam and reviewed and updated ROS and Plan today (11/23/2024). In review of yesterday's note (11/22/2024), there are no changes except as documented above.

## 2024-11-23 NOTE — PROGRESS NOTES
Radiology Progress Note   Author: SOLIS Salazar Date & Time created: 11/23/2024  8:58 AM   Date of admission  11/19/2024  Note to reader: this note follows the APSO format rather than the historical SOAP format. Assessment and plan located at the top of the note for ease of use.    Chief Complaint  89 y.o. male admitted 11/19/2024 with   Chief Complaint   Patient presents with    Blood in Urine       HPI  Felipe Chowdary is  a 89-year-old male with a past medical history of hypertension and benign prostatic hyperplasia (BPH), who recently underwent prostate embolization on 10/31/2024 with Dr. Tiwari (IR).  He presented to [facility] 11/19/2024 complaining of a 2-3 day history of hematuria, and dark stools.  Since prostate embolization, Felipe has also been experiencing some, dysuria, and urgency for which [facility].  [facility],  he was advised to take either Tylenol or ibuprofen for symptom relief and sent home.  Since being [facility] he has continued to experience hematuria, dysuria, urgency and melena.  Last night (11/19/24)he was admitted to [facility] further evaluation and workup of his urinary symptoms as well as melena.  IR has been consulted possible l left prostate artery embolization .  On 11/22/2020 24 Dr Tiwari performed left prostate artery embolization    Interval History:   11/23/2024-  I reviewed today's labs: WBC 8.7; H&H 12.0/35.2, Cr 1.22; Coags INR 1.07,  Micro urine culture negative.  New imaging today.        Assessment/Plan     Principal Problem:    Hematuria  Active Problems:    Hypertensive urgency    Melena    Hypoxia    BPH (benign prostatic hyperplasia)    Hyponatremia    Chest pain      Plan IR  -Patient okay to follow-up with Dr. Tiwari in [facility] in approximately 2 weeks - Radiology office will call and schedule  -okay to [facility] when medically cleared  -IR signing off    -Thank you for allowing Interventional Radiology team to participate in  the patients care, if any additional care or requests are needed in the future please do not hesitate to call or place IR order           Review of Systems  Physical Exam   Review of Systems   Constitutional:  Negative for chills and fever.   HENT:  Positive for hearing loss.    Respiratory:  Negative for cough and shortness of breath.    Cardiovascular:  Negative for chest pain.   Gastrointestinal:  Negative for abdominal pain, nausea and vomiting.   Genitourinary:  Positive for urgency.   Musculoskeletal:  Positive for myalgias.   Neurological:  Negative for dizziness, tingling, sensory change and headaches.      Vitals:    11/23/24 0805   BP: 133/68   Pulse: 86   Resp: 17   Temp: 36.3 °C (97.4 °F)   SpO2: 96%        Physical Exam  Vitals and nursing note reviewed.   HENT:      Head: Normocephalic and atraumatic.      Mouth/Throat:      Mouth: Mucous membranes are dry.      Pharynx: Oropharynx is clear.   Cardiovascular:      Rate and Rhythm: Normal rate.   Pulmonary:      Effort: Pulmonary effort is normal. No respiratory distress.   Abdominal:      Palpations: Abdomen is soft.   Genitourinary:     Comments: Three-way catheter in place without CBI and use-draining clear yellow urine-groin/penis without any discoloration  Skin:     General: Skin is warm and dry.      Capillary Refill: Capillary refill takes less than 2 seconds.   Neurological:      General: No focal deficit present.      Mental Status: He is alert and oriented to person, place, and time.   Psychiatric:         Mood and Affect: Mood normal.         Behavior: Behavior normal.             Labs    Recent Labs     11/21/24  0101 11/21/24  0902 11/22/24  0217 11/23/24  0102   WBC 7.2  --  8.2 8.7   RBC 3.76*  --  3.89* 3.86*   HEMOGLOBIN 11.1* 11.7* 11.4* 12.0*   HEMATOCRIT 33.2* 34.7* 34.8* 35.2*   MCV 88.3  --  89.5 91.2   MCH 29.5  --  29.3 31.1   MCHC 33.4  --  32.8 34.1   RDW 43.4  --  43.6 43.9   PLATELETCT 385  --  345 313   MPV 8.9*  --  8.9*  "9.4     Recent Labs     11/21/24  0101 11/22/24 0217 11/23/24 0102   SODIUM 132* 133* 132*   POTASSIUM 4.4 4.4 4.7   CHLORIDE 97 97 97   CO2 23 25 23   GLUCOSE 104* 101* 134*   BUN 19 21 23*   CREATININE 1.33 1.25 1.22   CALCIUM 9.0 8.8 8.9     Recent Labs     11/21/24  0101 11/22/24 0217 11/23/24 0102   CREATININE 1.33 1.25 1.22     DX-CHEST-PORTABLE (1 VIEW)   Final Result         1.  Pulmonary edema and/or infiltrates.   2.  Cardiomegaly   3.  Atherosclerosis      IR-EMBOLIZATION    (Results Pending)     INR   Date Value Ref Range Status   11/19/2024 1.07 0.87 - 1.13 Final     Comment:     INR - Non-therapeutic Reference Range: 0.87-1.13  INR - Therapeutic Reference Range: 2.0-4.0       No results found for: \"POCINR\"     Intake/Output Summary (Last 24 hours) at 11/23/2024 0858  Last data filed at 11/23/2024 0400  Gross per 24 hour   Intake 360 ml   Output 300 ml   Net 60 ml      I have personally reviewed the above labs and imaging      I have performed a physical exam and reviewed and updated ROS and Plan today (11/23/2024).     35 minutes in directly providing and coordinating care and extensive data review.  No time overlap and excludes procedures.    "

## 2024-11-23 NOTE — PROGRESS NOTES
Per TOV instructions, instilled 200mL of sterile water. Patient urge to void. Removed medrano and patient voided 175mL pinkish urine into urinal. Bladder scanned 38mL after void.

## 2024-11-23 NOTE — CARE PLAN
The patient is Stable - Low risk of patient condition declining or worsening    Shift Goals  Clinical Goals: Pain management/ monitor medrano output  Patient Goals: Pain control  Family Goals: ANNELIESE    Progress made toward(s) clinical / shift goals:  Patient medicated for pain, No BM yet, CBI still clamp,Resting comfortably, call light within reach.    Patient is not progressing towards the following goals:

## 2024-11-23 NOTE — CARE PLAN
The patient is Stable - Low risk of patient condition declining or worsening    Shift Goals  Clinical Goals: Monitor groin IR access site, possibly DC catheter, pain control  Patient Goals: Manage catheter pain  Family Goals: Family not present    Progress made toward(s) clinical / shift goals:      Problem: Urinary Elimination  Goal: Establish and maintain regular urinary output  Outcome: Progressing  Possible DC of medrano catheter today for voiding trial. Goal to remove catheter and have patient void in 6 hours. Will bladder scan if patient has pain, feels full, or is not voiding.      Problem: Pain - Standard  Goal: Alleviation of pain or a reduction in pain to the patient’s comfort goal  Outcome: Progressing  Patient had pain in penis area last night. He voiced concern that the pain is back and feels burning. Medication was given. Reassessment showed improved pain, from an 8 to 3 on scale of 1-10.

## 2024-11-24 ENCOUNTER — PHARMACY VISIT (OUTPATIENT)
Dept: PHARMACY | Facility: MEDICAL CENTER | Age: 89
End: 2024-11-24
Payer: COMMERCIAL

## 2024-11-24 VITALS
WEIGHT: 200.84 LBS | HEIGHT: 72 IN | HEART RATE: 87 BPM | TEMPERATURE: 98.5 F | SYSTOLIC BLOOD PRESSURE: 139 MMHG | DIASTOLIC BLOOD PRESSURE: 66 MMHG | RESPIRATION RATE: 18 BRPM | OXYGEN SATURATION: 90 % | BODY MASS INDEX: 27.2 KG/M2

## 2024-11-24 LAB
ANION GAP SERPL CALC-SCNC: 12 MMOL/L (ref 7–16)
BUN SERPL-MCNC: 20 MG/DL (ref 8–22)
CALCIUM SERPL-MCNC: 8.4 MG/DL (ref 8.5–10.5)
CHLORIDE SERPL-SCNC: 94 MMOL/L (ref 96–112)
CO2 SERPL-SCNC: 24 MMOL/L (ref 20–33)
CREAT SERPL-MCNC: 1.06 MG/DL (ref 0.5–1.4)
ERYTHROCYTE [DISTWIDTH] IN BLOOD BY AUTOMATED COUNT: 43.7 FL (ref 35.9–50)
GFR SERPLBLD CREATININE-BSD FMLA CKD-EPI: 65 ML/MIN/1.73 M 2
GLUCOSE SERPL-MCNC: 118 MG/DL (ref 65–99)
HCT VFR BLD AUTO: 34.3 % (ref 42–52)
HGB BLD-MCNC: 11.4 G/DL (ref 14–18)
MCH RBC QN AUTO: 29.6 PG (ref 27–33)
MCHC RBC AUTO-ENTMCNC: 33.2 G/DL (ref 32.3–36.5)
MCV RBC AUTO: 89.1 FL (ref 81.4–97.8)
PLATELET # BLD AUTO: 248 K/UL (ref 164–446)
PMV BLD AUTO: 9.7 FL (ref 9–12.9)
POTASSIUM SERPL-SCNC: 4.6 MMOL/L (ref 3.6–5.5)
RBC # BLD AUTO: 3.85 M/UL (ref 4.7–6.1)
SODIUM SERPL-SCNC: 130 MMOL/L (ref 135–145)
WBC # BLD AUTO: 11.6 K/UL (ref 4.8–10.8)

## 2024-11-24 PROCEDURE — 700102 HCHG RX REV CODE 250 W/ 637 OVERRIDE(OP): Performed by: STUDENT IN AN ORGANIZED HEALTH CARE EDUCATION/TRAINING PROGRAM

## 2024-11-24 PROCEDURE — 80048 BASIC METABOLIC PNL TOTAL CA: CPT

## 2024-11-24 PROCEDURE — A9270 NON-COVERED ITEM OR SERVICE: HCPCS | Performed by: STUDENT IN AN ORGANIZED HEALTH CARE EDUCATION/TRAINING PROGRAM

## 2024-11-24 PROCEDURE — 99239 HOSP IP/OBS DSCHRG MGMT >30: CPT | Performed by: STUDENT IN AN ORGANIZED HEALTH CARE EDUCATION/TRAINING PROGRAM

## 2024-11-24 PROCEDURE — 85027 COMPLETE CBC AUTOMATED: CPT

## 2024-11-24 PROCEDURE — RXMED WILLOW AMBULATORY MEDICATION CHARGE: Performed by: STUDENT IN AN ORGANIZED HEALTH CARE EDUCATION/TRAINING PROGRAM

## 2024-11-24 PROCEDURE — 36415 COLL VENOUS BLD VENIPUNCTURE: CPT

## 2024-11-24 RX ORDER — HYDROCODONE BITARTRATE AND ACETAMINOPHEN 5; 325 MG/1; MG/1
1 TABLET ORAL EVERY 12 HOURS PRN
Qty: 10 TABLET | Refills: 0 | Status: SHIPPED | OUTPATIENT
Start: 2024-11-24 | End: 2024-11-29

## 2024-11-24 RX ORDER — POLYETHYLENE GLYCOL 3350 17 G/17G
17 POWDER, FOR SOLUTION ORAL DAILY
Qty: 30 EACH | Refills: 0 | Status: SHIPPED | OUTPATIENT
Start: 2024-11-24

## 2024-11-24 RX ADMIN — POLYETHYLENE GLYCOL 3350 1 PACKET: 17 POWDER, FOR SOLUTION ORAL at 08:46

## 2024-11-24 RX ADMIN — HYDROCODONE BITARTRATE AND ACETAMINOPHEN 1 TABLET: 5; 325 TABLET ORAL at 05:05

## 2024-11-24 RX ADMIN — FINASTERIDE 5 MG: 5 TABLET, FILM COATED ORAL at 05:02

## 2024-11-24 RX ADMIN — LOSARTAN POTASSIUM 25 MG: 25 TABLET, FILM COATED ORAL at 05:03

## 2024-11-24 RX ADMIN — OMEPRAZOLE 20 MG: 20 CAPSULE, DELAYED RELEASE ORAL at 05:02

## 2024-11-24 ASSESSMENT — PAIN DESCRIPTION - PAIN TYPE
TYPE: ACUTE PAIN
TYPE: ACUTE PAIN

## 2024-11-24 NOTE — CARE PLAN
The patient is Stable - Low risk of patient condition declining or worsening    Shift Goals  Clinical Goals: Pain management/ patient safety  Patient Goals: Rest  Family Goals: ANNELIESE    Progress made toward(s) clinical / shift goals:  Patient on voiding trial, patient urinated , continent and in continent, resting comfortably, call light within reach.   For DC today,.   Complaints burning during urination.     Patient is not progressing towards the following goals:

## 2024-11-24 NOTE — PROGRESS NOTES
Pt discharged home in stable condition. Discharge instructions reviewed with pt and family verbalizing understanding. IV removed from right arm. Meds to beds filled and given to pt. All belongings left with pt. Transported to car with wheelchair.

## 2024-11-24 NOTE — DISCHARGE SUMMARY
"Discharge Summary    CHIEF COMPLAINT ON ADMISSION  Chief Complaint   Patient presents with    Blood in Urine       Reason for Admission  Blood in Urine     Admission Date  11/19/2024    CODE STATUS  Full Code    HPI & HOSPITAL COURSE    Mr. Felipe Chowdary \"Everardo" is a 89-year-old male with past medical history of hypertension, BPH, and recent right sided prostate artery embolization 10/31/2024 that presents with 1 day history of hematuria with clots and  history of melena.     Patient states that after his IR embolization he's been having burning sensation and pain after his procedure as well as hematuria and difficulty urinating. He was given Norco and told to take ibuprofen which he has been taking regularly. At some point he noticed dark stools, he also notes that he has been taking oral iron for the last 1 month.     In the ED, BP 140s to 210s/70s to 120s, HR 70s to 90s, RR 16-24, saturating 89 to 94% on room air.  Received fluconazole 200 mg p.o. x 1.  WBC 8.1, hemoglobin 10.3, , sodium 127, potassium 4.9, BUN 23, creatinine 1.35, alk phos 110.  UA red, bloody, WBC 6-10, RBC greater than 100, few bacteria, budding yeast present.    During his hospitalization, patient's hemoglobin level continued to stay steady at 11, 10.5, 10.7.  No bloody stool or melena noted since admission of which we will defer GI from being consulted at this time suspect his reported melena may have been due to his iron intake.   IR Dr. Tiwari consulted and case discussed with urology and he underwent left prostate artery embolization on 11/22/2024. He did well post procedure, underwent a voiding trial and medrnao was removed. He has been urinating clear yellow urine. He will follow up with urology outpatient and Therefore, he is discharged in fair and stable condition to home with close outpatient follow-up.    The patient met 2-midnight criteria for an inpatient stay at the time of discharge.    Discharge Date  11/24/2024    FOLLOW UP " ITEMS POST DISCHARGE  Resolution of hematuria, timing on resumption of aspirin   Chronic medical conditions     DISCHARGE DIAGNOSES  Principal Problem:    Hematuria (POA: Yes)  Active Problems:    Hypertensive urgency (POA: Yes)    Melena (POA: Yes)    Hypoxia (POA: Yes)    BPH (benign prostatic hyperplasia) (POA: Yes)    Hyponatremia (POA: Yes)    Chest pain (POA: Unknown)  Resolved Problems:    * No resolved hospital problems. *      FOLLOW UP  No future appointments.  [facility]  [address]  [facility] [address]  [phone]    Follow up  [facility] will contact patient to arrange outpatient follow up.    Farooq Vargas M.D.  [address]  [facility] [id number] [phone]    Schedule an appointment as soon as possible for a visit in 2 week(s)  hospital follow up      MEDICATIONS ON DISCHARGE     Medication List        START taking these medications        Instructions   polyethylene glycol/lytes Pack  Commonly known as: Miralax   Take 1 Packet by mouth every day.  Dose: 17 g            CHANGE how you take these medications        Instructions   HYDROcodone-acetaminophen 5-325 MG Tabs per tablet  What changed:   when to take this  reasons to take this  Commonly known as: Norco   Take 1 Tablet by mouth every 12 hours as needed (severe pain) for up to 5 days. Indications: Pain  Dose: 1 Tablet            CONTINUE taking these medications        Instructions   amLODIPine 5 MG Tabs  Commonly known as: Norvasc   Take 5 mg by mouth every evening.  Dose: 5 mg     docusate calcium 240 MG Caps  Commonly known as: Surfak   Take 240 mg by mouth every day.  Dose: 240 mg     finasteride 5 MG Tabs  Commonly known as: Proscar   Take 5 mg by mouth every day.  Dose: 5 mg     losartan 25 MG Tabs  Commonly known as: Cozaar   Take 25 mg by mouth every day.  Dose: 25 mg     pantoprazole 40 MG Tbec  Commonly known as: Protonix   Take 40 mg by mouth every day.  Dose: 40 mg     phenazopyridine 200 MG Tabs  Commonly known as:  Pyridium   Take 200 mg by mouth 3 times a day as needed.  Dose: 200 mg     terazosin 2 MG Caps  Commonly known as: Hytrin   Take 4 mg by mouth every evening. 2 mg x 2 capsules = 4 mg  Dose: 4 mg            STOP taking these medications      aspirin 81 MG EC tablet     ciprofloxacin 750 MG Tabs  Commonly known as: Cipro              Allergies  Allergies   Allergen Reactions    Atorvastatin      Does not know    Cephalosporins      Does not know    Erythromycin Swelling    Penicillins Swelling    Sulfa Drugs Shortness of Breath    Atenolol      Does not know       DIET  Orders Placed This Encounter   Procedures    Diet Order Diet: Cardiac     Standing Status:   Standing     Number of Occurrences:   1     Order Specific Question:   Diet:     Answer:   Cardiac [6]       ACTIVITY  As tolerated.      CONSULTATIONS  Urology   Interventional radiology     PROCEDURES  Left prostate artery embolization     LABORATORY  Lab Results   Component Value Date    SODIUM 130 (L) 11/24/2024    POTASSIUM 4.6 11/24/2024    CHLORIDE 94 (L) 11/24/2024    CO2 24 11/24/2024    GLUCOSE 118 (H) 11/24/2024    BUN 20 11/24/2024    CREATININE 1.06 11/24/2024        Lab Results   Component Value Date    WBC 11.6 (H) 11/24/2024    HEMOGLOBIN 11.4 (L) 11/24/2024    HEMATOCRIT 34.3 (L) 11/24/2024    PLATELETCT 248 11/24/2024        Total time of the discharge process exceeds 35 minutes.

## 2024-11-24 NOTE — PROGRESS NOTES
Note to reader: this note follows the APSO format rather than the historical SOAP format. Assessment and plan located at the top of the note for ease of use.    Chief Complaint  89 y.o. year old male here with Blood in Urine      Assessment/Plan  Interval History   Active Hospital Problems    Diagnosis     Chest pain [R07.9]     Hematuria [R31.9]     Hypertensive urgency [I16.0]     Melena [K92.1]     Hypoxia [R09.02]     BPH (benign prostatic hyperplasia) [N40.0]     Hyponatremia [E87.1]      11/23. S/p successful PAE with IR yesterday. Urine clear yellow in medrano tubing. TOV completed today, successful. AFVSS, labs and vitals stable.     11/22. Lying in bed in NAD. Seen prior to PAE. CBI clamped, urine clear yellow in medrano. H/H improving, AFVSS, Cr WNL. Denies pain, N/V/F/C. Later this afternoon underwent successful PAE on the left side with IR.    Plan:  - OK to discharge when cleared by primary team  - [facility] [place] will contact patient to arrange outpatient follow up in ~2 weeks  - No further urologic intervention anticipated during this admission. Urology signing off, please call with questions.       Case discussed with Dr Mejia, who has directed this patient's plan of care.      Review of Systems  Physical Exam   Review of Systems   Constitutional:  Negative for chills and fever.   Gastrointestinal:  Negative for abdominal pain, nausea and vomiting.   Genitourinary:  Negative for dysuria, flank pain and hematuria.   All other systems reviewed and are negative.    Vitals:    11/22/24 2353 11/23/24 0400 11/23/24 0805 11/23/24 1525   BP:  136/71 133/68 113/54   Pulse:  83 86 80   Resp: 17 16 17 18   Temp:  36.7 °C (98 °F) 36.3 °C (97.4 °F) 36.7 °C (98.1 °F)   TempSrc:  Temporal Temporal Temporal   SpO2:  90% 96% 95%   Weight:  91.1 kg (200 lb 13.4 oz)     Height:         Physical Exam  Vitals and nursing note reviewed.   Constitutional:       General: He is not in acute distress.  HENT:      Head:  Normocephalic.      Nose: Nose normal.      Mouth/Throat:      Pharynx: Oropharynx is clear.   Eyes:      Conjunctiva/sclera: Conjunctivae normal.   Pulmonary:      Effort: Pulmonary effort is normal.   Abdominal:      General: There is no distension.      Palpations: Abdomen is soft.   Genitourinary:     Comments: 3 way medrano in place, CBI clamped, urine clear yellow in medrano tubing  Musculoskeletal:         General: Normal range of motion.      Cervical back: Normal range of motion.   Skin:     General: Skin is warm and dry.   Neurological:      General: No focal deficit present.      Mental Status: He is alert and oriented to person, place, and time.   Psychiatric:         Mood and Affect: Mood normal.         Behavior: Behavior normal.          Hematology Chemistry   Lab Results   Component Value Date/Time    WBC 8.7 11/23/2024 01:02 AM    HEMOGLOBIN 12.0 (L) 11/23/2024 01:02 AM    HEMATOCRIT 35.2 (L) 11/23/2024 01:02 AM    PLATELETCT 313 11/23/2024 01:02 AM     Lab Results   Component Value Date/Time    SODIUM 132 (L) 11/23/2024 01:02 AM    POTASSIUM 4.7 11/23/2024 01:02 AM    CHLORIDE 97 11/23/2024 01:02 AM    CO2 23 11/23/2024 01:02 AM    GLUCOSE 134 (H) 11/23/2024 01:02 AM    BUN 23 (H) 11/23/2024 01:02 AM    CREATININE 1.22 11/23/2024 01:02 AM         Labs not explicitly included in this progress note were reviewed by the author.   Radiology/imaging not explicitly included in this progress note was reviewed by the author.     Medications reviewed, Labs reviewed and Radiology images reviewed

## 2024-11-24 NOTE — DISCHARGE INSTRUCTIONS
Take medications as directed   Recommend that  you hold  your aspirin until  you follow up with urology and are cleared to resume   You have been given medication for pain, take only as needed would minimize use as able as this medication can cause constipation   Recommend daily stool softners to avoid constipation and good oral hydration with water   Follow up with [facility] [place], they will call to schedule however if you do not hear back from them, you can call to schedule, their information is below   If you are unable to empty your bladder, develop abdominal pain, nausea, vomiting or fevers, seek medical attention

## 2024-11-25 ENCOUNTER — TELEPHONE (OUTPATIENT)
Dept: HEALTH INFORMATION MANAGEMENT | Facility: OTHER | Age: 89
End: 2024-11-25
Payer: MEDICARE

## 2024-12-31 ENCOUNTER — OFFICE VISIT (OUTPATIENT)
Dept: URGENT CARE | Facility: PHYSICIAN GROUP | Age: 89
End: 2024-12-31
Payer: MEDICARE

## 2024-12-31 VITALS
WEIGHT: 192 LBS | OXYGEN SATURATION: 97 % | DIASTOLIC BLOOD PRESSURE: 76 MMHG | SYSTOLIC BLOOD PRESSURE: 112 MMHG | BODY MASS INDEX: 26.01 KG/M2 | HEIGHT: 72 IN | RESPIRATION RATE: 18 BRPM | HEART RATE: 82 BPM | TEMPERATURE: 97.9 F

## 2024-12-31 DIAGNOSIS — J40 BRONCHITIS: ICD-10-CM

## 2024-12-31 RX ORDER — DEXTROMETHORPHAN HYDROBROMIDE AND PROMETHAZINE HYDROCHLORIDE 15; 6.25 MG/5ML; MG/5ML
5 SYRUP ORAL EVERY 4 HOURS PRN
Qty: 120 ML | Refills: 0 | Status: SHIPPED | OUTPATIENT
Start: 2024-12-31

## 2024-12-31 RX ORDER — DOXYCYCLINE HYCLATE 100 MG
100 TABLET ORAL 2 TIMES DAILY
Qty: 14 TABLET | Refills: 0 | Status: SHIPPED | OUTPATIENT
Start: 2024-12-31 | End: 2025-01-07

## 2024-12-31 ASSESSMENT — ENCOUNTER SYMPTOMS
VOMITING: 0
FEVER: 0
WHEEZING: 0
CHILLS: 0
NAUSEA: 0
HEMOPTYSIS: 0
SHORTNESS OF BREATH: 0
SORE THROAT: 0
SPUTUM PRODUCTION: 1
COUGH: 1
SINUS PAIN: 0

## 2024-12-31 ASSESSMENT — FIBROSIS 4 INDEX: FIB4 SCORE: 1.74

## 2024-12-31 NOTE — PROGRESS NOTES
"Subjective:   Felipe Chowdary is a 89 y.o. male who presents for Pharyngitis (X 3.5 weeks, cough, chest congestion, difficulty breathing due to cough, pt stated bronchial tubes are inflamed. )        Patient presents with concerns of \"bronchial inflammation\" for the last several weeks.  States that he initially had cough and cold symptoms as well as a sore throat.  His nasal congestion and sore throat have resolved, but he continues to have a wet productive cough.  He feels that he has a difficult time clearing the mucus from his lungs.  He denies fevers, chills, nausea, vomiting, chest pain, difficulty breathing, shortness of breath.  He is not taking any medications for symptoms.  States that he has had similar symptoms in the past with bronchitis.  Historically symptoms have cleared with antibiotic therapy.        Review of Systems   Constitutional:  Negative for chills and fever.   HENT:  Negative for congestion, sinus pain and sore throat.    Respiratory:  Positive for cough and sputum production. Negative for hemoptysis, shortness of breath and wheezing.    Cardiovascular:  Negative for chest pain.   Gastrointestinal:  Negative for nausea and vomiting.       PMH:  has no past medical history on file.  MEDS:   Current Outpatient Medications:     doxycycline (VIBRAMYCIN) 100 MG Tab, Take 1 Tablet by mouth 2 times a day for 7 days., Disp: 14 Tablet, Rfl: 0    promethazine-dextromethorphan (PROMETHAZINE-DM) 6.25-15 MG/5ML syrup, Take 5 mL by mouth every four hours as needed for Cough., Disp: 120 mL, Rfl: 0    docusate calcium (SURFAK) 240 MG Cap, Take 240 mg by mouth every day., Disp: , Rfl:     amLODIPine (NORVASC) 5 MG Tab, Take 5 mg by mouth every evening., Disp: , Rfl:     finasteride (PROSCAR) 5 MG Tab, Take 5 mg by mouth every day., Disp: , Rfl:     losartan (COZAAR) 25 MG Tab, Take 25 mg by mouth every day., Disp: , Rfl:     terazosin (HYTRIN) 2 MG Cap, Take 4 mg by mouth every evening. 2 mg x 2 capsules " = 4 mg, Disp: , Rfl:     polyethylene glycol/lytes (MIRALAX) Pack, Take 1 Packet by mouth every day. (Patient not taking: Reported on 12/31/2024), Disp: 30 Each, Rfl: 0    phenazopyridine (PYRIDIUM) 200 MG Tab, Take 200 mg by mouth 3 times a day as needed. (Patient not taking: Reported on 12/31/2024), Disp: , Rfl:     pantoprazole (PROTONIX) 40 MG Tablet Delayed Response, Take 40 mg by mouth every day. (Patient not taking: Reported on 12/31/2024), Disp: , Rfl:   ALLERGIES:   Allergies   Allergen Reactions    Atorvastatin      Does not know    Cephalosporins      Does not know    Erythromycin Swelling    Penicillins Swelling    Sulfa Drugs Shortness of Breath    Atenolol      Does not know     SURGHX: History reviewed. No pertinent surgical history.  SOCHX:  reports that he has quit smoking. He has never used smokeless tobacco. He reports that he does not currently use alcohol. He reports that he does not use drugs.  FH: Family history was reviewed, no pertinent findings to report   Objective:   /76 (BP Location: Left arm, Patient Position: Sitting, BP Cuff Size: Adult)   Pulse 82   Temp 36.6 °C (97.9 °F) (Temporal)   Resp 18   Ht 1.829 m (6')   Wt 87.1 kg (192 lb)   SpO2 97%   BMI 26.04 kg/m²   Physical Exam  Vitals reviewed.   Constitutional:       General: He is not in acute distress.     Appearance: Normal appearance. He is well-developed. He is not toxic-appearing.   HENT:      Head: Normocephalic and atraumatic.      Right Ear: External ear normal.      Left Ear: External ear normal.      Nose: Nose normal. No congestion or rhinorrhea.      Mouth/Throat:      Lips: Pink.      Mouth: Mucous membranes are moist.      Pharynx: Oropharynx is clear. Uvula midline.   Cardiovascular:      Rate and Rhythm: Normal rate and regular rhythm.      Heart sounds: Normal heart sounds, S1 normal and S2 normal.   Pulmonary:      Effort: Pulmonary effort is normal. No respiratory distress.      Breath sounds: Normal  breath sounds. No stridor. No decreased breath sounds, wheezing, rhonchi or rales.      Comments: Occasional harsh wet cough.  Right sided rhonchi that clear with coughing.  No rales or wheezes.  Skin:     General: Skin is dry.   Neurological:      Comments: Alert and oriented.    Psychiatric:         Speech: Speech normal.         Behavior: Behavior normal.           Assessment/Plan:   1. Bronchitis  - doxycycline (VIBRAMYCIN) 100 MG Tab; Take 1 Tablet by mouth 2 times a day for 7 days.  Dispense: 14 Tablet; Refill: 0  - promethazine-dextromethorphan (PROMETHAZINE-DM) 6.25-15 MG/5ML syrup; Take 5 mL by mouth every four hours as needed for Cough.  Dispense: 120 mL; Refill: 0    Exam today consistent with bronchitis.  Given duration of symptoms I do recommend that we begin patient on antibiotic therapy.  Patient started on doxycycline.  Patient also requests a prescription antitussives.  He was advised that this will make him drowsy and does increase his fall risk.  He verbalized good understanding of how to use this medication appropriately.  If no improvement within 2 to 3 days, new symptoms develop at any point or symptoms worsen return to clinic or see PCP for reevaluation.

## 2025-02-18 NOTE — PROGRESS NOTES
Neuro Interventional Service Consultation     Telephone Appointment Visit   This telephone visit was initiated by the provider and the patient verbally consented.    Reason for Call:  Hospital Follow-up    Felipe Chodwary was seen today in follow up after right and left prostate artery embolizations performed by Francisco Javier Tiwari MD at [facility].    Interventional radiologist: Francisco Javier Tiwari MD  Urologist: Javy Hernandez MD  PCP: Farooq Vargas MD    History of Present Illness:  Initial consultation 10/8/24:  has a history of gross hematuria, diagnosed with UTI, and resolved with antibiotics. He had a cystoscopy that showed obstructive prostate. He had other episodes of bleeding off and on, no etiology. Has had several bladder infections including 3 in the past year. He takes aspirin 81 mg for heart valve disease. He is taking finasteride and terazosin that help his symptoms. Imaging revealed an enlarged prostate and a cystoscopy redemonstrated this finding. He has declined contrast for his imaging studies due to concerns over developing an allergic reaction.  has been referred to the Interventional Radiology Service for evaluation and management. Today, he complains of nocturia, sometimes about every 90 minutes. He voids small quantities and feels like he empties completely. He denies current symptoms of UTI. He lives in [place] and is accompanied by a support person to today's appointment. He is prepared with a list of questions about the procedure and expected clinical course and outcome.    Interval history 2/18/25: Mr. Chowdary presented for elective PAE on 10/31/24. The right side of the prostate was able to be catheterized with embolization performed but the left side was tortuous and no embolization was performed on that laterality. Post procedure he contacted our office several times and presented to his local ED for complaints of urodynia and ultimately, gross hematuria with copious clots. He was  "transferred to [facility] on 11/19/24 for the hematuria and was also noted to be hypoxic and anemic. He underwent successful urgent embolization of the left prostate on 11/22/24 and the hematuria resolved and he successfully completed a voiding trial. He had a yeast infection in his urine that was treated. He was discharged to home on 11/24/24. He presents for follow up after the procedure. Today, he states he feels \"pretty good.\" He initially had burning on urination after his hospitalization but it resolved. He has not had gross hematuria since his hospitalization. He reports recent urgency with occasional urinary incontinence and feelings of incomplete emptying. He has seen his urologist for this but is reluctant to undergo repeat cystoscopy. He again asked us about laser treatments as his brother had one and it resolved his urinary symptoms.     No past medical history on file.    Patient Active Problem List    Diagnosis Date Noted    Chest pain 11/21/2024    Hematuria 11/19/2024    Hypertensive urgency 11/19/2024    Melena 11/19/2024    Hypoxia 11/19/2024    BPH (benign prostatic hyperplasia) 11/19/2024    Hyponatremia 11/19/2024      No past surgical history on file.  Social History     Socioeconomic History    Marital status:      Spouse name: Not on file    Number of children: Not on file    Years of education: Not on file    Highest education level: Not on file   Occupational History    Not on file   Tobacco Use    Smoking status: Former    Smokeless tobacco: Never   Vaping Use    Vaping status: Never Used   Substance and Sexual Activity    Alcohol use: Not Currently    Drug use: Never    Sexual activity: Not on file   Other Topics Concern    Not on file   Social History Narrative    Not on file     Social Drivers of Health     Financial Resource Strain: Not on file   Food Insecurity: No Food Insecurity (11/20/2024)    Hunger Vital Sign     Worried About Running Out of Food in the Last Year: Never true "     Ran Out of Food in the Last Year: Never true   Transportation Needs: No Transportation Needs (11/20/2024)    PRAPARE - Transportation     Lack of Transportation (Medical): No     Lack of Transportation (Non-Medical): No   Physical Activity: Not on file   Stress: Not on file   Social Connections: Not on file   Intimate Partner Violence: Not At Risk (11/20/2024)    Humiliation, Afraid, Rape, and Kick questionnaire     Fear of Current or Ex-Partner: No     Emotionally Abused: No     Physically Abused: No     Sexually Abused: No   Housing Stability: Low Risk  (11/20/2024)    Housing Stability Vital Sign     Unable to Pay for Housing in the Last Year: No     Number of Times Moved in the Last Year: 1     Homeless in the Last Year: No     No family history on file.    Review of Systems   Genitourinary:  Positive for frequency and urgency. Negative for dysuria and hematuria.     A comprehensive 14-point review of systems was negative except as described above.       10/8/24:  IPSS: 15  QoL: 4    Labs:    Latest Reference Range & Units Most Recent   WBC 4.8 - 10.8 K/uL 11.6 (H)  11/24/24 03:30   RBC 4.70 - 6.10 M/uL 3.85 (L)  11/24/24 03:30   Hemoglobin 14.0 - 18.0 g/dL 11.4 (L)  11/24/24 03:30   Hematocrit 42.0 - 52.0 % 34.3 (L)  11/24/24 03:30   MCV 81.4 - 97.8 fL 89.1  11/24/24 03:30   MCH 27.0 - 33.0 pg 29.6  11/24/24 03:30   MCHC 32.3 - 36.5 g/dL 33.2  11/24/24 03:30   RDW 35.9 - 50.0 fL 43.7  11/24/24 03:30   Platelet Count 164 - 446 K/uL 248  11/24/24 03:30   MPV 9.0 - 12.9 fL 9.7  11/24/24 03:30   Neutrophils-Polys 44.00 - 72.00 % 62.70  11/20/24 01:13   Neutrophils (Absolute) 1.82 - 7.42 K/uL 4.63  11/20/24 01:13   Lymphocytes 22.00 - 41.00 % 26.90  11/20/24 01:13   Lymphs (Absolute) 1.00 - 4.80 K/uL 1.99  11/20/24 01:13   Monocytes 0.00 - 13.40 % 8.10  11/20/24 01:13   Monos (Absolute) 0.00 - 0.85 K/uL 0.60  11/20/24 01:13   Eosinophils 0.00 - 6.90 % 0.40  11/20/24 01:13   Eos (Absolute) 0.00 - 0.51 K/uL  0.03  11/20/24 01:13   Basophils 0.00 - 1.80 % 0.30  11/20/24 01:13   Baso (Absolute) 0.00 - 0.12 K/uL 0.02  11/20/24 01:13   Immature Granulocytes 0.00 - 0.90 % 1.60 (H)  11/20/24 01:13   Immature Granulocytes (abs) 0.00 - 0.11 K/uL 0.12 (H)  11/20/24 01:13   Nucleated RBC 0.00 - 0.20 /100 WBC 0.00  11/20/24 01:13   NRBC (Absolute) K/uL 0.00  11/20/24 01:13   Sodium 135 - 145 mmol/L 130 (L)  11/24/24 03:30   Potassium 3.6 - 5.5 mmol/L 4.6  11/24/24 03:30   Chloride 96 - 112 mmol/L 94 (L)  11/24/24 03:30   Co2 20 - 33 mmol/L 24  11/24/24 03:30   Anion Gap 7.0 - 16.0  12.0  11/24/24 03:30   Glucose 65 - 99 mg/dL 118 (H)  11/24/24 03:30   Bun 8 - 22 mg/dL 20  11/24/24 03:30   Creatinine 0.50 - 1.40 mg/dL 1.06  11/24/24 03:30   GFR (CKD-EPI) >60 mL/min/1.73 m 2 65  11/24/24 03:30   Calcium 8.5 - 10.5 mg/dL 8.4 (L)  11/24/24 03:30   Correct Calcium 8.5 - 10.5 mg/dL 9.4  11/20/24 01:13   AST(SGOT) 12 - 45 U/L 27  11/20/24 01:13   ALT(SGPT) 2 - 50 U/L 34  11/20/24 01:13   Alkaline Phosphatase 30 - 99 U/L 114 (H)  11/20/24 01:13   Total Bilirubin 0.1 - 1.5 mg/dL 0.8  11/20/24 01:13   Albumin 3.2 - 4.9 g/dL 3.7  11/20/24 01:13   Total Protein 6.0 - 8.2 g/dL 6.6  11/20/24 01:13   Globulin 1.9 - 3.5 g/dL 2.9  11/20/24 01:13   A-G Ratio g/dL 1.3  11/20/24 01:13   Osmolality Serum 278 - 298 mOsm/kg H2O 274 (L)  11/20/24 01:13   Troponin T 6 - 19 ng/L 27 (H)  11/21/24 12:23   INR 0.87 - 1.13  1.07  11/19/24 21:24   PT 12.0 - 14.6 sec 13.9  11/19/24 21:24   APTT 24.7 - 36.0 sec 28.5  11/19/24 21:24   Color  Red !  11/19/24 19:00   Character  Bloody !  11/19/24 19:00   WBC /hpf 6-10 !  11/19/24 19:00   RBC 0 - 2 /hpf >100 !  11/19/24 19:00   Epithelial Cells 0 - 5 /hpf 0-2  11/19/24 19:00   Bacteria None /hpf Few !  11/19/24 19:00   Urine Casts 0 - 2 /lpf 0-2  11/19/24 19:00   Budding Yeast Absent /hpf Present !  11/19/24 19:00   POC Color Negative  orange  11/2/24 16:05   POC Appearance Negative  cloudy  11/2/24 16:05   POC  Specific Gravity <1.005 - >1.030  1.020  11/2/24 16:05   POC Urine PH 5.0 - 8.0  5.0  11/2/24 16:05   POC Glucose Negative mg/dL 100 mg/dL  11/2/24 16:05   POC Ketones Negative mg/dL 15 mg/dL  11/2/24 16:05   POC Protein Negative mg/dL >= 300 mg/dL  11/2/24 16:05   POC Nitrites Negative  positive  11/2/24 16:05   POC Leukocyte Esterase Negative  large  11/2/24 16:05   POC Blood Negative  small  11/2/24 16:05   POC Bilirubin Negative mg/dL small  11/2/24 16:05   POC Urobiligen Negative (0.2) mg/dL 4.0 E.U./dL  11/2/24 16:05   % Free Psa % Not Done  10/31/24 06:35   Free Psa ng/mL Not Done  10/31/24 06:35   PSA Total 0.0 - 4.0 ng/mL 1.5  10/31/24 06:35   TSH 0.380 - 5.330 uIU/mL 2.210  11/19/24 21:24   Significant Indicator  NEG  11/2/24 16:13   Site  URINE, CLEAN CATCH  11/2/24 16:13   Source  UR  11/2/24 16:13   (H): Data is abnormally high  (L): Data is abnormally low  !: Data is abnormal    Pathology:  None available for review    Radiology:   Interventional radiology procedure 11/22/24, at [facility]:  1.  Technically successful particle embolization of the left prostate artery.  2.  The patient was not discharged on antibiotics given extensive allergy history and prior ciprofloxacin possibly contributing to his development of a urinary tract yeast infection. Family was counseled regarding return precautions if patient develops   symptoms of a urinary tract infection.    CXR 11/20/24 at [facility]:  1.  Pulmonary edema and/or infiltrates.  2.  Cardiomegaly  3.  Atherosclerosis    Interventional radiology procedure 10/31/24 at [facility]:  1.  Technically successful particle embolization of the right prostate artery.  2.  The left side was not catheterized due to tortuous anatomy and severe atherosclerotic changes. If patient does not have relief of symptoms with unilateral prostatic artery embolization, recommend ipsilateral left common femoral access for left   prostate artery embolization.  3.  Patient was discharged on 10  days of ciprofloxacin.    CT  on 9/17/24 at [facility]:    Current Outpatient Medications   Medication Sig Dispense Refill    promethazine-dextromethorphan (PROMETHAZINE-DM) 6.25-15 MG/5ML syrup Take 5 mL by mouth every four hours as needed for Cough. 120 mL 0    polyethylene glycol/lytes (MIRALAX) Pack Take 1 Packet by mouth every day. (Patient not taking: Reported on 12/31/2024) 30 Each 0    phenazopyridine (PYRIDIUM) 200 MG Tab Take 200 mg by mouth 3 times a day as needed. (Patient not taking: Reported on 12/31/2024)      docusate calcium (SURFAK) 240 MG Cap Take 240 mg by mouth every day.      amLODIPine (NORVASC) 5 MG Tab Take 5 mg by mouth every evening.      finasteride (PROSCAR) 5 MG Tab Take 5 mg by mouth every day.      losartan (COZAAR) 25 MG Tab Take 25 mg by mouth every day.      terazosin (HYTRIN) 2 MG Cap Take 4 mg by mouth every evening. 2 mg x 2 capsules = 4 mg      pantoprazole (PROTONIX) 40 MG Tablet Delayed Response Take 40 mg by mouth every day. (Patient not taking: Reported on 12/31/2024)       No current facility-administered medications for this visit.       Allergies   Allergen Reactions    Atorvastatin      Does not know    Cephalosporins      Does not know    Erythromycin Swelling    Penicillins Swelling    Sulfa Drugs Shortness of Breath    Atenolol      Does not know       Physical Exam  Neurological:      Mental Status: He is alert and oriented to person, place, and time.   Psychiatric:         Mood and Affect: Mood normal.         Cognition and Memory: Memory normal.         Judgment: Judgment normal.     Impression:   1. Gross hematuria, resolved.  2. Recurrent urinary tract infections.  3. Enlarged prostate.  4. Chronic kidney disease.   5. Hypertension.     Plan:   Francisco Javier Tiwari MD has reviewed 's history and imaging studies, examined the patient, and discussed treatment options. He has had good treatment effect with the resolution of gross hematuria. It is unclear why he has  developed new urinary symptoms and we instructed him to continue to follow up with all his other physicians on the care team including his urologist. We also recommend that he discuss laser treatments with that physician as it is not something we offer nor do we have expertise to answer his questions about his candidacy. All questions were answered.        MARLYN Dunaway with Francisco Javier Tiwari MD  Interventional Radiology   [facility]   [id number]  [place] [address]  [phone]

## 2025-02-21 ENCOUNTER — HOSPITAL ENCOUNTER (OUTPATIENT)
Dept: RADIOLOGY | Facility: MEDICAL CENTER | Age: OVER 89
End: 2025-02-21
Attending: NURSE PRACTITIONER
Payer: MEDICARE

## 2025-02-21 DIAGNOSIS — Z09 POSTOP CHECK: ICD-10-CM

## 2025-03-07 ENCOUNTER — OFFICE VISIT (OUTPATIENT)
Dept: URGENT CARE | Facility: PHYSICIAN GROUP | Age: OVER 89
End: 2025-03-07
Payer: COMMERCIAL

## 2025-03-07 VITALS
WEIGHT: 189.8 LBS | HEIGHT: 72 IN | DIASTOLIC BLOOD PRESSURE: 74 MMHG | OXYGEN SATURATION: 95 % | SYSTOLIC BLOOD PRESSURE: 138 MMHG | RESPIRATION RATE: 12 BRPM | TEMPERATURE: 96.8 F | BODY MASS INDEX: 25.71 KG/M2 | HEART RATE: 71 BPM

## 2025-03-07 DIAGNOSIS — J22 LRTI (LOWER RESPIRATORY TRACT INFECTION): ICD-10-CM

## 2025-03-07 PROCEDURE — 3075F SYST BP GE 130 - 139MM HG: CPT

## 2025-03-07 PROCEDURE — 99213 OFFICE O/P EST LOW 20 MIN: CPT

## 2025-03-07 PROCEDURE — 3078F DIAST BP <80 MM HG: CPT

## 2025-03-07 RX ORDER — DOXYCYCLINE HYCLATE 100 MG
100 TABLET ORAL 2 TIMES DAILY
Qty: 10 TABLET | Refills: 0 | Status: SHIPPED | OUTPATIENT
Start: 2025-03-07 | End: 2025-03-12

## 2025-03-07 RX ORDER — OMEPRAZOLE 40 MG/1
40 CAPSULE, DELAYED RELEASE ORAL DAILY
COMMUNITY
Start: 2025-02-28

## 2025-03-07 ASSESSMENT — FIBROSIS 4 INDEX: FIB4 SCORE: 1.74

## 2025-03-07 NOTE — PROGRESS NOTES
"Chief Complaint   Patient presents with    Cough     C/o bronchial infection, requesting antibiotics, wheezing, congestion, chest tightness x 2 weeks.          Subjective:   HISTORY OF PRESENT ILLNESS: Felipe Chowdary is a 89 y.o. male who presents for 2-3 weeks of wet productive cough and wheezing.  No hx of COPD.  Reports he gets \"bronchial infections\" every year.  He feels that he is having a hard time getting the mucus up and feels mild SOB and wheezing at times, no hx of asthma   Patient denies chest pain, difficulty breathing, no fevers.  He is able to go about his ADL's.     Medications, Allergies, current problem list, Social and Family history reviewed today in Epic.     Objective:     /74 (BP Location: Left arm, Patient Position: Sitting, BP Cuff Size: Adult long)   Pulse 71   Temp 36 °C (96.8 °F) (Temporal)   Resp 12   Ht 1.829 m (6')   Wt 86.1 kg (189 lb 12.8 oz)   SpO2 95%     Physical Exam  Vitals reviewed.   Constitutional:       Appearance: Normal appearance. He is not toxic-appearing.   HENT:      Head:      Jaw: No trismus.      Right Ear: Tympanic membrane normal.      Left Ear: Tympanic membrane normal.      Nose: Rhinorrhea present.      Mouth/Throat:      Mouth: Mucous membranes are moist.      Pharynx: Uvula midline. Posterior oropharyngeal erythema present. No pharyngeal swelling, oropharyngeal exudate or uvula swelling.      Tonsils: No tonsillar exudate or tonsillar abscesses. 1+ on the right. 1+ on the left.      Comments: No muffled voice, trismus, unilateral deviation of the uvula, soft palate fullness or edema. No oral airway compromise, or drooling noted.   Eyes:      Conjunctiva/sclera: Conjunctivae normal.   Cardiovascular:      Rate and Rhythm: Normal rate and regular rhythm.      Heart sounds: Normal heart sounds.   Pulmonary:      Effort: Pulmonary effort is normal. No respiratory distress.      Breath sounds: Examination of the right-lower field reveals rhonchi. " Examination of the left-lower field reveals rhonchi. Rhonchi present. No decreased breath sounds or wheezing.      Comments: Bilateral lower rhonchi that clears with cough  Musculoskeletal:      Cervical back: Full passive range of motion without pain and neck supple.   Skin:     General: Skin is warm and dry.   Neurological:      Mental Status: He is alert and oriented to person, place, and time.   Psychiatric:         Mood and Affect: Mood normal.          Assessment/Plan:     Diagnosis and associated orders    I personally reviewed prior external notes and test results pertinent to today's visit.     1. LRTI (lower respiratory tract infection)  doxycycline (VIBRAMYCIN) 100 MG Tab            IMPRESSION: The patient is well appearing here with reassuring exam and vitals signs. Symptomatology is consistent with post viral cough but given his length of symptoms will initiate antibiotics.    Discussed anticipated duration of illness, return to work/school, and indications to RTC or go to the Emergency department.    Patient states understanding of the plan of care and discharge instructions.  They are discharged in stable condition.         Please note that this dictation was created using voice recognition software. I have made a reasonable attempt to correct obvious errors, but I expect that there are errors of grammar and possibly content that I did not discover before finalizing the note.    This note was electronically signed by SOLIS Lowry

## 2025-05-06 ENCOUNTER — HOSPITAL ENCOUNTER (OUTPATIENT)
Dept: RADIOLOGY | Facility: MEDICAL CENTER | Age: OVER 89
End: 2025-05-06
Attending: INTERNAL MEDICINE
Payer: COMMERCIAL

## 2025-05-06 DIAGNOSIS — R91.1 COIN LESION: ICD-10-CM

## 2025-05-06 LAB — GLUCOSE BLD-MCNC: 88 MG/DL (ref 65–99)

## 2025-05-06 PROCEDURE — A9552 F18 FDG: HCPCS

## 2025-06-11 ENCOUNTER — OFFICE VISIT (OUTPATIENT)
Dept: URGENT CARE | Facility: PHYSICIAN GROUP | Age: OVER 89
End: 2025-06-11
Payer: COMMERCIAL

## 2025-06-11 VITALS
RESPIRATION RATE: 16 BRPM | WEIGHT: 191.6 LBS | OXYGEN SATURATION: 94 % | DIASTOLIC BLOOD PRESSURE: 86 MMHG | HEIGHT: 72 IN | HEART RATE: 73 BPM | SYSTOLIC BLOOD PRESSURE: 134 MMHG | TEMPERATURE: 97.7 F | BODY MASS INDEX: 25.95 KG/M2

## 2025-06-11 DIAGNOSIS — J40 BRONCHITIS WITH ACUTE WHEEZING: Primary | ICD-10-CM

## 2025-06-11 PROCEDURE — 3079F DIAST BP 80-89 MM HG: CPT | Performed by: NURSE PRACTITIONER

## 2025-06-11 PROCEDURE — 99214 OFFICE O/P EST MOD 30 MIN: CPT | Performed by: NURSE PRACTITIONER

## 2025-06-11 PROCEDURE — 3075F SYST BP GE 130 - 139MM HG: CPT | Performed by: NURSE PRACTITIONER

## 2025-06-11 RX ORDER — ALBUTEROL SULFATE 90 UG/1
2 INHALANT RESPIRATORY (INHALATION) EVERY 6 HOURS PRN
Qty: 8.5 G | Refills: 0 | Status: SHIPPED | OUTPATIENT
Start: 2025-06-11

## 2025-06-11 RX ORDER — DOXYCYCLINE HYCLATE 100 MG
100 TABLET ORAL 2 TIMES DAILY
Qty: 10 TABLET | Refills: 0 | Status: SHIPPED | OUTPATIENT
Start: 2025-06-11 | End: 2025-06-16

## 2025-06-11 ASSESSMENT — FIBROSIS 4 INDEX: FIB4 SCORE: 1.74

## 2025-06-12 NOTE — PROGRESS NOTES
Chief Complaint   Patient presents with    Cough     Cough, chest congestion x 8 days           History of Present Illness  The patient is a 89-year-old male who presents for evaluation of a cough.    He reports that his cough has progressed to involve his bronchial tubes. Approximately 8 days ago, he experienced symptoms suggestive of a cold or influenza, which he managed with over-the-counter cold medications. Despite initial improvement, the cough subsequently worsened and became more severe, leading to coughing spasms. He describes the cough as non-productive, often leaving him feeling fatigued and sore in the bronchial area. He does not experience constant coughing but reports sudden episodes of severe coughing that can occur during conversation or at rest, occasionally leading to difficulty breathing. He also reports wheezing at night when lying down. He has not experienced any fevers or lower extremity swelling. He has been self-medicating with decongestant cough syrup, which he finds ineffective. He is currently taking Robitussin for cough and congestion. He has a history of similar symptoms dating back to his youth, for which he was prescribed codeine-containing cough syrup. He recalls a previous visit in 03/2025 for similar symptoms, which were successfully treated with antibiotics. He has no history of COPD or asthma. He has no history of diabetes. He has a past history of smoking but quit approximately 35 years ago. He has previously used an inhaler for a short period.    SOCIAL HISTORY  He smoked many years ago and has not smoked in 35 years.         ROS:    No severe shortness of breath   No Cardiac like chest pain, as discussed   As otherwise stated in HPI    Medical/SX/ Social History:  Reviewed per chart    Pertinent Medications:    Medications Ordered Prior to Encounter[1]     Allergies:    Atorvastatin, Cephalosporins, Erythromycin, Penicillins, Sulfa drugs, and Atenolol     Problem list,  medications, and allergies reviewed by myself today in Epic     Physical Exam:    Vitals:    06/11/25 1755   BP: 134/86   Pulse: 73   Resp: 16   Temp: 36.5 °C (97.7 °F)   SpO2: 94%             Physical Exam  Constitutional:       General: He is awake.      Appearance: Normal appearance. He is not ill-appearing, toxic-appearing or diaphoretic.   HENT:      Head: Normocephalic and atraumatic.      Right Ear: Tympanic membrane, ear canal and external ear normal.      Left Ear: Tympanic membrane, ear canal and external ear normal.      Nose: Rhinorrhea present. Rhinorrhea is clear.      Mouth/Throat:      Lips: Pink.      Mouth: Mucous membranes are moist.      Tongue: No lesions.      Palate: No lesions.      Pharynx: Posterior oropharyngeal erythema present. No oropharyngeal exudate or uvula swelling.      Tonsils: No tonsillar exudate or tonsillar abscesses.   Eyes:      General: Lids are normal. Gaze aligned appropriately. No allergic shiner or scleral icterus.     Extraocular Movements: Extraocular movements intact.      Conjunctiva/sclera: Conjunctivae normal.      Pupils: Pupils are equal, round, and reactive to light.   Cardiovascular:      Rate and Rhythm: Normal rate and regular rhythm.      Pulses:           Radial pulses are 2+ on the right side and 2+ on the left side.      Heart sounds: Normal heart sounds.   Pulmonary:      Effort: Pulmonary effort is normal.      Breath sounds: Normal air entry. Wheezing present. No decreased breath sounds, rhonchi or rales.   Abdominal:      General: Abdomen is flat. Bowel sounds are normal.      Palpations: Abdomen is soft.      Tenderness: There is no abdominal tenderness.   Musculoskeletal:      Right lower leg: No edema.      Left lower leg: No edema.   Lymphadenopathy:      Cervical: No cervical adenopathy.   Skin:     General: Skin is warm.      Capillary Refill: Capillary refill takes less than 2 seconds.      Coloration: Skin is not cyanotic or pale.    Neurological:      Mental Status: He is alert and oriented to person, place, and time.      Gait: Gait is intact.   Psychiatric:         Behavior: Behavior normal. Behavior is cooperative.            Medical Decision making and plan :  I personally reviewed prior external notes and test results pertinent to today's visit. Pt is clinically stable at today's acute urgent care visit.  Patient appears nontoxic with no acute distress noted. Appropriate for outpatient care at this time.    Pleasant 89 y.o. male presented clinic with:     Assessment & Plan  Bronchitis with wheezing  - The cough is likely due to a secondary infection following a viral illness, as evidenced by his wheezing.  - Increased wheezing and bronchospasms noted.  - Discussed the nature of secondary infections and the role of antibiotics and steroids.   -Suspect possible underlying chronic bronchitis due to recurrent symptoms.  Due to this we will treat with antibiotics.  - Prescribed doxycycline 100 mg twice daily for 5 days and an albuterol inhaler.  Did recommend chest x-ray patient reasonably declined.  Advised to return for a chest x-ray if no improvement after 2 full days of treatment.      Shared decision-making was utilized with patient for treatment plan. Medication discussed included indication for use and the potential benefits and side effects. Education was provided regarding the aforementioned assessments.  Differential Diagnosis, natural history, and supportive care discussed. All of the patient's questions were answered to their satisfaction at the time of discharge. Patient was encouraged to monitor symptoms closely. Those signs and symptoms which would warrant concern and mandate seeking a higher level of service through the emergency department discussed at length.  Patient stated agreement and understanding of this plan of care.    Disposition:  Home in stable condition     Voice Recognition Disclaimer:  Portions of this document  were created using voice recognition software and [employer] technology provided by [facility].  Patient was informed of [email or web address] technology being used.  The software does have a chance of producing errors of grammar and possibly content. I have made every reasonable attempt to correct obvious errors, but there could be errors of grammar and possibly content that I did not discover before finalizing the documentation.    JIGNESH Perez.          [1]   Current Outpatient Medications on File Prior to Visit   Medication Sig Dispense Refill    finasteride (PROSCAR) 5 MG Tab Take 5 mg by mouth every day.      losartan (COZAAR) 25 MG Tab Take 25 mg by mouth every day.      omeprazole (PRILOSEC) 40 MG delayed-release capsule Take 40 mg by mouth every day. (Patient not taking: Reported on 6/11/2025)      promethazine-dextromethorphan (PROMETHAZINE-DM) 6.25-15 MG/5ML syrup Take 5 mL by mouth every four hours as needed for Cough. (Patient not taking: Reported on 6/11/2025) 120 mL 0    polyethylene glycol/lytes (MIRALAX) Pack Take 1 Packet by mouth every day. (Patient not taking: Reported on 12/31/2024) 30 Each 0    phenazopyridine (PYRIDIUM) 200 MG Tab Take 200 mg by mouth 3 times a day as needed. (Patient not taking: Reported on 12/31/2024)      docusate calcium (SURFAK) 240 MG Cap Take 240 mg by mouth every day. (Patient not taking: Reported on 6/11/2025)      amLODIPine (NORVASC) 5 MG Tab Take 5 mg by mouth every evening. (Patient not taking: Reported on 6/11/2025)      terazosin (HYTRIN) 2 MG Cap Take 4 mg by mouth every evening. 2 mg x 2 capsules = 4 mg (Patient not taking: Reported on 6/11/2025)      pantoprazole (PROTONIX) 40 MG Tablet Delayed Response Take 40 mg by mouth every day. (Patient not taking: Reported on 12/31/2024)       No current facility-administered medications on file prior to visit.

## 2025-06-16 ENCOUNTER — OFFICE VISIT (OUTPATIENT)
Dept: URGENT CARE | Facility: PHYSICIAN GROUP | Age: OVER 89
End: 2025-06-16
Payer: COMMERCIAL

## 2025-06-16 VITALS
TEMPERATURE: 97 F | SYSTOLIC BLOOD PRESSURE: 98 MMHG | WEIGHT: 188 LBS | HEIGHT: 72 IN | DIASTOLIC BLOOD PRESSURE: 48 MMHG | HEART RATE: 86 BPM | RESPIRATION RATE: 18 BRPM | OXYGEN SATURATION: 97 % | BODY MASS INDEX: 25.47 KG/M2

## 2025-06-16 DIAGNOSIS — J40 BRONCHITIS: Primary | ICD-10-CM

## 2025-06-16 PROCEDURE — 99213 OFFICE O/P EST LOW 20 MIN: CPT | Performed by: NURSE PRACTITIONER

## 2025-06-16 PROCEDURE — 3074F SYST BP LT 130 MM HG: CPT | Performed by: NURSE PRACTITIONER

## 2025-06-16 PROCEDURE — 3078F DIAST BP <80 MM HG: CPT | Performed by: NURSE PRACTITIONER

## 2025-06-16 RX ORDER — BENZONATATE 100 MG/1
100 CAPSULE ORAL 3 TIMES DAILY PRN
Qty: 30 CAPSULE | Refills: 0 | Status: SHIPPED | OUTPATIENT
Start: 2025-06-16

## 2025-06-16 ASSESSMENT — FIBROSIS 4 INDEX: FIB4 SCORE: 1.74

## 2025-06-16 ASSESSMENT — ENCOUNTER SYMPTOMS
HEMOPTYSIS: 0
SHORTNESS OF BREATH: 0
SPUTUM PRODUCTION: 1
FEVER: 0
COUGH: 1

## 2025-06-16 NOTE — PROGRESS NOTES
"  Subjective:     Felipe Chowdary is a 89 y.o. male who presents for Cough (C/o cough, congestion, and weakness x1.5 weeks. )      Patient presents for continued  \"cough cough cough\". He has some improvement with the antibiotic. Reports occasional production.    Cough  The current episode started 1 to 4 weeks ago. The cough is Productive of sputum. Pertinent negatives include no fever, hemoptysis or shortness of breath. Treatments tried: Antibiotic, robitussin, albuterol. His past medical history is significant for bronchitis.       Past Medical History[1]    Past Surgical History[2]    Social History     Socioeconomic History    Marital status:      Spouse name: Not on file    Number of children: Not on file    Years of education: Not on file    Highest education level: Not on file   Occupational History    Not on file   Tobacco Use    Smoking status: Former    Smokeless tobacco: Never   Vaping Use    Vaping status: Never Used   Substance and Sexual Activity    Alcohol use: Not Currently    Drug use: Never    Sexual activity: Not on file   Other Topics Concern    Not on file   Social History Narrative    Not on file     Social Drivers of Health     Financial Resource Strain: Not on file   Food Insecurity: No Food Insecurity (11/20/2024)    Hunger Vital Sign     Worried About Running Out of Food in the Last Year: Never true     Ran Out of Food in the Last Year: Never true   Transportation Needs: No Transportation Needs (11/20/2024)    PRAPARE - Transportation     Lack of Transportation (Medical): No     Lack of Transportation (Non-Medical): No   Physical Activity: Not on file   Stress: Not on file   Social Connections: Not on file   Intimate Partner Violence: Not At Risk (11/20/2024)    Humiliation, Afraid, Rape, and Kick questionnaire     Fear of Current or Ex-Partner: No     Emotionally Abused: No     Physically Abused: No     Sexually Abused: No   Housing Stability: Low Risk  (11/20/2024)    Housing " Stability Vital Sign     Unable to Pay for Housing in the Last Year: No     Number of Times Moved in the Last Year: 1     Homeless in the Last Year: No        History reviewed. No pertinent family history.     Allergies[3]    Review of Systems   Constitutional:  Negative for fever.   HENT:  Positive for congestion.    Respiratory:  Positive for cough and sputum production. Negative for hemoptysis and shortness of breath.    All other systems reviewed and are negative.       Objective:   BP 98/48 (BP Location: Right arm, Patient Position: Sitting, BP Cuff Size: Adult)   Pulse 86   Temp 36.1 °C (97 °F) (Temporal)   Resp 18   Ht 1.829 m (6') Comment: PT reported  Wt 85.3 kg (188 lb)   SpO2 97%   BMI 25.50 kg/m²     Physical Exam  Vitals reviewed.   Constitutional:       General: He is not in acute distress.     Appearance: He is well-developed. He is not toxic-appearing.   HENT:      Head: Normocephalic and atraumatic.      Right Ear: External ear normal.      Left Ear: External ear normal.      Nose: Mucosal edema present.      Mouth/Throat:      Mouth: Mucous membranes are moist.   Eyes:      Conjunctiva/sclera: Conjunctivae normal.   Cardiovascular:      Rate and Rhythm: Normal rate.   Pulmonary:      Effort: Pulmonary effort is normal. No bradypnea, accessory muscle usage, prolonged expiration, respiratory distress or retractions.      Breath sounds: No stridor. No wheezing, rhonchi or rales.      Comments: Cough noted.  Skin:     General: Skin is warm and dry.      Findings: No rash.   Neurological:      Mental Status: He is alert and oriented to person, place, and time.      GCS: GCS eye subscore is 4. GCS verbal subscore is 5. GCS motor subscore is 6.   Psychiatric:         Speech: Speech normal.         Assessment/Plan:   1. Bronchitis  - benzonatate (TESSALON) 100 MG Cap; Take 1 Capsule by mouth 3 times a day as needed for Cough.  Dispense: 30 Capsule; Refill: 0    Symptomatic care.  -Oral hydration and  rest.   -Cough control: nonpharmacologic options for cough relief such as throat lozenges, hot tea, honey.  -Tylenol for pain and fever as directed.   -Warm salt water gargles.  -OTC Throat lozenges or spray (Cepacol).  -Saline nasal spray.    Seek emergency medical care immediately for: Trouble breathing, persistent pain or pressure in the chest, confusion, inability to wake or stay awake, bluish lips or face, persistent tachycardia (fast heart rate), prolonged dizziness, persistent high grade fevers. Follow up for prolonged cough, persistent wheezing, persistent throat pain, difficulty swallowing, persistent fevers, leg swelling, or any other concerns. Follow up with your Primary Care Provider.     -Presents with acute viral respiratory symptoms x 1.5 weeks. No pneumonia noted on exam. He had been prescriv=bed an oral antibiotic. Discussed viral etiology, duration of cough with bronchitis, symptomatic care, and S&S of PNA with follow up. Stable Vitals. Has ROSENDO. Also advised monitoring his BP, and oral hydration.     Differential diagnosis, natural history, supportive care, and indications for immediate follow-up discussed.         [1] History reviewed. No pertinent past medical history.  [2] History reviewed. No pertinent surgical history.  [3]   Allergies  Allergen Reactions    Atorvastatin      Does not know    Cephalosporins      Does not know    Erythromycin Swelling    Penicillins Swelling    Sulfa Drugs Shortness of Breath    Atenolol      Does not know

## 2025-06-16 NOTE — PATIENT INSTRUCTIONS
Symptomatic care.  -Oral hydration and rest.   -Cough control: nonpharmacologic options for cough relief such as throat lozenges, hot tea, honey.  -Tylenol for pain and fever as directed.   -Warm salt water gargles.  -OTC Throat lozenges or spray (Cepacol).  -Saline nasal spray.    Seek emergency medical care immediately for: Trouble breathing, persistent pain or pressure in the chest, confusion, inability to wake or stay awake, bluish lips or face, persistent tachycardia (fast heart rate), prolonged dizziness, persistent high grade fevers. Follow up for prolonged cough, persistent wheezing, persistent throat pain, difficulty swallowing, persistent fevers, leg swelling, or any other concerns. Follow up with your Primary Care Provider.

## 2025-08-06 ENCOUNTER — OFFICE VISIT (OUTPATIENT)
Dept: URGENT CARE | Facility: PHYSICIAN GROUP | Age: OVER 89
End: 2025-08-06
Payer: MEDICARE

## 2025-08-06 VITALS
WEIGHT: 192.5 LBS | OXYGEN SATURATION: 93 % | RESPIRATION RATE: 18 BRPM | SYSTOLIC BLOOD PRESSURE: 130 MMHG | HEART RATE: 75 BPM | TEMPERATURE: 97.3 F | HEIGHT: 72 IN | DIASTOLIC BLOOD PRESSURE: 72 MMHG | BODY MASS INDEX: 26.07 KG/M2

## 2025-08-06 DIAGNOSIS — L03.114 CELLULITIS OF LEFT FOREARM: Primary | ICD-10-CM

## 2025-08-06 DIAGNOSIS — L23.9 ALLERGIC CONTACT DERMATITIS, UNSPECIFIED TRIGGER: ICD-10-CM

## 2025-08-06 PROCEDURE — 3078F DIAST BP <80 MM HG: CPT | Performed by: FAMILY MEDICINE

## 2025-08-06 PROCEDURE — 3075F SYST BP GE 130 - 139MM HG: CPT | Performed by: FAMILY MEDICINE

## 2025-08-06 PROCEDURE — 99213 OFFICE O/P EST LOW 20 MIN: CPT | Performed by: FAMILY MEDICINE

## 2025-08-06 RX ORDER — TRIAMCINOLONE ACETONIDE 1 MG/G
1 CREAM TOPICAL 2 TIMES DAILY
Qty: 30 G | Refills: 1 | Status: SHIPPED | OUTPATIENT
Start: 2025-08-06 | End: 2025-08-20

## 2025-08-06 RX ORDER — DOXYCYCLINE HYCLATE 100 MG
100 TABLET ORAL 2 TIMES DAILY
Qty: 14 TABLET | Refills: 0 | Status: SHIPPED | OUTPATIENT
Start: 2025-08-06 | End: 2025-08-13

## 2025-08-06 ASSESSMENT — ENCOUNTER SYMPTOMS
EYE REDNESS: 0
CHILLS: 0
NAUSEA: 0
SORE THROAT: 0
SHORTNESS OF BREATH: 0
VOMITING: 0
EYE DISCHARGE: 0
MYALGIAS: 0
FEVER: 0
COUGH: 0

## 2025-08-06 ASSESSMENT — FIBROSIS 4 INDEX: FIB4 SCORE: 1.74
